# Patient Record
Sex: FEMALE | Race: BLACK OR AFRICAN AMERICAN | Employment: FULL TIME | URBAN - METROPOLITAN AREA
[De-identification: names, ages, dates, MRNs, and addresses within clinical notes are randomized per-mention and may not be internally consistent; named-entity substitution may affect disease eponyms.]

---

## 2022-02-19 ENCOUNTER — HOSPITAL ENCOUNTER (OUTPATIENT)
Age: 37
Setting detail: OBSERVATION
Discharge: HOME | End: 2022-02-20
Attending: OBSTETRICS & GYNECOLOGY | Admitting: OBSTETRICS & GYNECOLOGY
Payer: COMMERCIAL

## 2022-02-19 ENCOUNTER — APPOINTMENT (OUTPATIENT)
Dept: GENERAL RADIOLOGY | Age: 37
End: 2022-02-19
Payer: COMMERCIAL

## 2022-02-19 DIAGNOSIS — D50.0 BLOOD LOSS ANEMIA: Primary | ICD-10-CM

## 2022-02-19 PROBLEM — D64.9 ANEMIA: Status: ACTIVE | Noted: 2022-02-19

## 2022-02-19 LAB
A/G RATIO: 1.6 (ref 1.1–2.2)
ABO/RH: NORMAL
ALBUMIN SERPL-MCNC: 4.4 G/DL (ref 3.4–5)
ALP BLD-CCNC: 59 U/L (ref 40–129)
ALT SERPL-CCNC: <5 U/L (ref 10–40)
ANION GAP SERPL CALCULATED.3IONS-SCNC: 10 MMOL/L (ref 3–16)
ANTIBODY SCREEN: NORMAL
APTT: 28.3 SEC (ref 26.2–38.6)
AST SERPL-CCNC: 14 U/L (ref 15–37)
BACTERIA: ABNORMAL /HPF
BASOPHILS ABSOLUTE: 0.1 K/UL (ref 0–0.2)
BASOPHILS RELATIVE PERCENT: 1.1 %
BILIRUB SERPL-MCNC: <0.2 MG/DL (ref 0–1)
BILIRUBIN URINE: NEGATIVE
BLOOD, URINE: ABNORMAL
BUN BLDV-MCNC: 6 MG/DL (ref 7–20)
CALCIUM SERPL-MCNC: 9.2 MG/DL (ref 8.3–10.6)
CHLORIDE BLD-SCNC: 102 MMOL/L (ref 99–110)
CLARITY: CLEAR
CO2: 24 MMOL/L (ref 21–32)
COLOR: YELLOW
CREAT SERPL-MCNC: 0.6 MG/DL (ref 0.6–1.1)
EOSINOPHILS ABSOLUTE: 0.1 K/UL (ref 0–0.6)
EOSINOPHILS RELATIVE PERCENT: 1.5 %
EPITHELIAL CELLS, UA: 1 /HPF (ref 0–5)
GFR AFRICAN AMERICAN: >60
GFR NON-AFRICAN AMERICAN: >60
GLUCOSE BLD-MCNC: 116 MG/DL (ref 70–99)
GLUCOSE URINE: NEGATIVE MG/DL
HCG QUALITATIVE: NEGATIVE
HCT VFR BLD CALC: 22 % (ref 36–48)
HEMOGLOBIN: 6.8 G/DL (ref 12–16)
HYALINE CASTS: 0 /LPF (ref 0–8)
INR BLD: 0.98 (ref 0.88–1.12)
KETONES, URINE: NEGATIVE MG/DL
LEUKOCYTE ESTERASE, URINE: NEGATIVE
LYMPHOCYTES ABSOLUTE: 1.5 K/UL (ref 1–5.1)
LYMPHOCYTES RELATIVE PERCENT: 32.4 %
MCH RBC QN AUTO: 23.6 PG (ref 26–34)
MCHC RBC AUTO-ENTMCNC: 30.7 G/DL (ref 31–36)
MCV RBC AUTO: 76.9 FL (ref 80–100)
MICROSCOPIC EXAMINATION: YES
MONOCYTES ABSOLUTE: 0.3 K/UL (ref 0–1.3)
MONOCYTES RELATIVE PERCENT: 7.3 %
NEUTROPHILS ABSOLUTE: 2.7 K/UL (ref 1.7–7.7)
NEUTROPHILS RELATIVE PERCENT: 57.7 %
NITRITE, URINE: NEGATIVE
PDW BLD-RTO: 18.6 % (ref 12.4–15.4)
PH UA: 8.5 (ref 5–8)
PLATELET # BLD: 446 K/UL (ref 135–450)
PMV BLD AUTO: 7.5 FL (ref 5–10.5)
POTASSIUM REFLEX MAGNESIUM: 4.4 MMOL/L (ref 3.5–5.1)
PROTEIN UA: 30 MG/DL
PROTHROMBIN TIME: 11.1 SEC (ref 9.9–12.7)
RBC # BLD: 2.86 M/UL (ref 4–5.2)
RBC UA: 25 /HPF (ref 0–4)
SODIUM BLD-SCNC: 136 MMOL/L (ref 136–145)
SPECIFIC GRAVITY UA: 1.02 (ref 1–1.03)
TOTAL PROTEIN: 7.2 G/DL (ref 6.4–8.2)
TROPONIN: <0.01 NG/ML
URINE REFLEX TO CULTURE: ABNORMAL
URINE TYPE: ABNORMAL
UROBILINOGEN, URINE: 0.2 E.U./DL
WBC # BLD: 4.7 K/UL (ref 4–11)
WBC UA: 1 /HPF (ref 0–5)

## 2022-02-19 PROCEDURE — 84484 ASSAY OF TROPONIN QUANT: CPT

## 2022-02-19 PROCEDURE — 85730 THROMBOPLASTIN TIME PARTIAL: CPT

## 2022-02-19 PROCEDURE — 86850 RBC ANTIBODY SCREEN: CPT

## 2022-02-19 PROCEDURE — 71045 X-RAY EXAM CHEST 1 VIEW: CPT

## 2022-02-19 PROCEDURE — 85025 COMPLETE CBC W/AUTO DIFF WBC: CPT

## 2022-02-19 PROCEDURE — 84703 CHORIONIC GONADOTROPIN ASSAY: CPT

## 2022-02-19 PROCEDURE — 2580000003 HC RX 258: Performed by: OBSTETRICS & GYNECOLOGY

## 2022-02-19 PROCEDURE — 86923 COMPATIBILITY TEST ELECTRIC: CPT

## 2022-02-19 PROCEDURE — 85610 PROTHROMBIN TIME: CPT

## 2022-02-19 PROCEDURE — 80053 COMPREHEN METABOLIC PANEL: CPT

## 2022-02-19 PROCEDURE — 86900 BLOOD TYPING SEROLOGIC ABO: CPT

## 2022-02-19 PROCEDURE — 93005 ELECTROCARDIOGRAM TRACING: CPT | Performed by: EMERGENCY MEDICINE

## 2022-02-19 PROCEDURE — G0378 HOSPITAL OBSERVATION PER HR: HCPCS

## 2022-02-19 PROCEDURE — 81001 URINALYSIS AUTO W/SCOPE: CPT

## 2022-02-19 PROCEDURE — 99283 EMERGENCY DEPT VISIT LOW MDM: CPT

## 2022-02-19 PROCEDURE — P9016 RBC LEUKOCYTES REDUCED: HCPCS

## 2022-02-19 PROCEDURE — A4216 STERILE WATER/SALINE, 10 ML: HCPCS | Performed by: OBSTETRICS & GYNECOLOGY

## 2022-02-19 PROCEDURE — 86901 BLOOD TYPING SEROLOGIC RH(D): CPT

## 2022-02-19 PROCEDURE — 36415 COLL VENOUS BLD VENIPUNCTURE: CPT

## 2022-02-19 RX ORDER — SODIUM CHLORIDE 9 MG/ML
INJECTION, SOLUTION INTRAVENOUS CONTINUOUS
Status: DISCONTINUED | OUTPATIENT
Start: 2022-02-19 | End: 2022-02-20 | Stop reason: HOSPADM

## 2022-02-19 RX ORDER — SODIUM CHLORIDE 0.9 % (FLUSH) 0.9 %
5-40 SYRINGE (ML) INJECTION PRN
Status: DISCONTINUED | OUTPATIENT
Start: 2022-02-19 | End: 2022-02-20 | Stop reason: HOSPADM

## 2022-02-19 RX ORDER — SODIUM CHLORIDE 9 MG/ML
INJECTION, SOLUTION INTRAVENOUS PRN
Status: DISCONTINUED | OUTPATIENT
Start: 2022-02-19 | End: 2022-02-20 | Stop reason: HOSPADM

## 2022-02-19 RX ORDER — ACETAMINOPHEN 650 MG/1
650 SUPPOSITORY RECTAL EVERY 6 HOURS PRN
Status: DISCONTINUED | OUTPATIENT
Start: 2022-02-19 | End: 2022-02-20 | Stop reason: HOSPADM

## 2022-02-19 RX ORDER — ONDANSETRON 4 MG/1
4 TABLET, ORALLY DISINTEGRATING ORAL EVERY 8 HOURS PRN
Status: DISCONTINUED | OUTPATIENT
Start: 2022-02-19 | End: 2022-02-20 | Stop reason: HOSPADM

## 2022-02-19 RX ORDER — SODIUM CHLORIDE 9 MG/ML
25 INJECTION, SOLUTION INTRAVENOUS PRN
Status: DISCONTINUED | OUTPATIENT
Start: 2022-02-19 | End: 2022-02-20 | Stop reason: HOSPADM

## 2022-02-19 RX ORDER — ONDANSETRON 2 MG/ML
4 INJECTION INTRAMUSCULAR; INTRAVENOUS EVERY 6 HOURS PRN
Status: DISCONTINUED | OUTPATIENT
Start: 2022-02-19 | End: 2022-02-20 | Stop reason: HOSPADM

## 2022-02-19 RX ORDER — SODIUM CHLORIDE 0.9 % (FLUSH) 0.9 %
5-40 SYRINGE (ML) INJECTION EVERY 12 HOURS SCHEDULED
Status: DISCONTINUED | OUTPATIENT
Start: 2022-02-19 | End: 2022-02-20 | Stop reason: HOSPADM

## 2022-02-19 RX ORDER — ACETAMINOPHEN 325 MG/1
650 TABLET ORAL EVERY 6 HOURS PRN
Status: DISCONTINUED | OUTPATIENT
Start: 2022-02-19 | End: 2022-02-20 | Stop reason: HOSPADM

## 2022-02-19 RX ADMIN — SODIUM CHLORIDE: 9 INJECTION, SOLUTION INTRAVENOUS at 21:52

## 2022-02-19 RX ADMIN — Medication 10 ML: at 21:58

## 2022-02-19 ASSESSMENT — PAIN DESCRIPTION - ORIENTATION: ORIENTATION: RIGHT

## 2022-02-19 ASSESSMENT — PAIN DESCRIPTION - LOCATION: LOCATION: ARM

## 2022-02-19 ASSESSMENT — PAIN DESCRIPTION - PAIN TYPE: TYPE: ACUTE PAIN

## 2022-02-19 ASSESSMENT — PAIN DESCRIPTION - DESCRIPTORS: DESCRIPTORS: ACHING

## 2022-02-20 VITALS
RESPIRATION RATE: 18 BRPM | WEIGHT: 143.52 LBS | DIASTOLIC BLOOD PRESSURE: 70 MMHG | OXYGEN SATURATION: 100 % | TEMPERATURE: 97.9 F | HEIGHT: 63 IN | BODY MASS INDEX: 25.43 KG/M2 | SYSTOLIC BLOOD PRESSURE: 106 MMHG | HEART RATE: 82 BPM

## 2022-02-20 LAB
BLOOD BANK DISPENSE STATUS: NORMAL
BLOOD BANK PRODUCT CODE: NORMAL
BPU ID: NORMAL
DESCRIPTION BLOOD BANK: NORMAL
EKG ATRIAL RATE: 86 BPM
EKG DIAGNOSIS: NORMAL
EKG P AXIS: 57 DEGREES
EKG P-R INTERVAL: 140 MS
EKG Q-T INTERVAL: 368 MS
EKG QRS DURATION: 68 MS
EKG QTC CALCULATION (BAZETT): 440 MS
EKG R AXIS: 37 DEGREES
EKG T AXIS: -11 DEGREES
EKG VENTRICULAR RATE: 86 BPM
HCT VFR BLD CALC: 29 % (ref 36–48)
HEMOGLOBIN: 9.3 G/DL (ref 12–16)
IRON SATURATION: 14 % (ref 15–50)
IRON: 59 UG/DL (ref 37–145)
MCH RBC QN AUTO: 25.7 PG (ref 26–34)
MCHC RBC AUTO-ENTMCNC: 32 G/DL (ref 31–36)
MCV RBC AUTO: 80.2 FL (ref 80–100)
PDW BLD-RTO: 18.7 % (ref 12.4–15.4)
PLATELET # BLD: 391 K/UL (ref 135–450)
PMV BLD AUTO: 7.3 FL (ref 5–10.5)
RBC # BLD: 3.61 M/UL (ref 4–5.2)
TOTAL IRON BINDING CAPACITY: 431 UG/DL (ref 260–445)
WBC # BLD: 4.7 K/UL (ref 4–11)

## 2022-02-20 PROCEDURE — P9016 RBC LEUKOCYTES REDUCED: HCPCS

## 2022-02-20 PROCEDURE — 36415 COLL VENOUS BLD VENIPUNCTURE: CPT

## 2022-02-20 PROCEDURE — 93010 ELECTROCARDIOGRAM REPORT: CPT | Performed by: INTERNAL MEDICINE

## 2022-02-20 PROCEDURE — 2580000003 HC RX 258: Performed by: OBSTETRICS & GYNECOLOGY

## 2022-02-20 PROCEDURE — 83540 ASSAY OF IRON: CPT

## 2022-02-20 PROCEDURE — 85027 COMPLETE CBC AUTOMATED: CPT

## 2022-02-20 PROCEDURE — G0378 HOSPITAL OBSERVATION PER HR: HCPCS

## 2022-02-20 PROCEDURE — 94760 N-INVAS EAR/PLS OXIMETRY 1: CPT

## 2022-02-20 PROCEDURE — A4216 STERILE WATER/SALINE, 10 ML: HCPCS | Performed by: OBSTETRICS & GYNECOLOGY

## 2022-02-20 PROCEDURE — 83550 IRON BINDING TEST: CPT

## 2022-02-20 PROCEDURE — 36430 TRANSFUSION BLD/BLD COMPNT: CPT

## 2022-02-20 RX ORDER — FERROUS SULFATE 325(65) MG
325 TABLET ORAL 2 TIMES DAILY
Qty: 60 TABLET | Refills: 1 | Status: SHIPPED | OUTPATIENT
Start: 2022-02-20

## 2022-02-20 RX ORDER — IBUPROFEN 800 MG/1
800 TABLET ORAL EVERY 6 HOURS PRN
Qty: 50 TABLET | Refills: 3 | Status: SHIPPED | OUTPATIENT
Start: 2022-02-20

## 2022-02-20 RX ADMIN — Medication 10 ML: at 08:11

## 2022-02-20 ASSESSMENT — PAIN SCALES - GENERAL: PAINLEVEL_OUTOF10: 0

## 2022-02-20 NOTE — ED PROVIDER NOTES
[address] [facility]        Pt Name: Erich Lopez  MRN: [id number]  [place] 1985  Date of evaluation: 2/19/2022  Provider: Cong Laura PA-C  PCP: No primary care provider on file. Note Started: 8:20 PM EST       [place]. I have evaluated this patient. My supervising physician was available for consultation. I personally saw the patient and independently provided 32 minutes of non-concurrent critical care time out of the total critical care time provided. This excludes time spent doing separately billable procedures. This includes time at the bedside, data interpretation, medication management, obtaining critical history from collateral sources if the patient is unable to provide it directly, and physician consultation. Specifics of interventions taken and potentially life-threatening diagnostic considerations are listed above in the medical decision making. CHIEF COMPLAINT       Chief Complaint   Patient presents with    Fatigue     Patient states last week she has been feeling increased fatigue that began a week ago. Patient states she was previously admitted in december for same complaint and needed a blood transfusion, feels similiar today. Patient states she also is having shortness of breath. HISTORY OF PRESENT ILLNESS   (Location, Timing/Onset, Context/Setting, Quality, Duration, Modifying Factors, Severity, Associated Signs and Symptoms)  Note limiting factors. Chief Complaint: General fatigue    Erich Lopez is a 39 y.o. female who presents to the emergency department with a chief complaint of general fatigue, lightheadedness, shortness of breath. This been ongoing for the past 1 week. States she was having some chest pain earlier in the week but has not had any pain for multiple days. Having some occasional cramping in her abdomen but denies any pain at this time.   States that she was admitted to the hospital in December requiring blood transfusion related to heavy menses and uterine fibroids. Currently on iron supplements. Denies any other ongoing medical issues and not on any other medication. States that she began her menstrual cycle 6 days ago and was extremely heavy 2 days ago. Now she is just having spotting. Denies vomiting, hemoptysis, hematemesis, hematuria, black tarry stools or bloody stools or any bleeding anywhere else. Nursing Notes were all reviewed and agreed with or any disagreements were addressed in the HPI. REVIEW OF SYSTEMS    (2-9 systems for level 4, 10 or more for level 5)     Review of Systems    Positives and Pertinent negatives as per HPI. Except as noted above in the ROS, all other systems were reviewed and negative. PAST MEDICAL HISTORY   History reviewed. No pertinent past medical history. SURGICAL HISTORY   History reviewed. No pertinent surgical history. CURRENTMEDICATIONS       Previous Medications    No medications on file         ALLERGIES     Patient has no known allergies. FAMILYHISTORY     History reviewed. No pertinent family history. SOCIAL HISTORY       Social History     Tobacco Use    Smoking status: Never Smoker    Smokeless tobacco: Never Used   Vaping Use    Vaping Use: Never used   Substance Use Topics    Alcohol use: Never    Drug use: Yes     Types: Marijuana (Weed)       SCREENINGS    [place] Coma Scale  Eye Opening: Spontaneous  Best Verbal Response: Oriented  Best Motor Response: Obeys commands  [place] Coma Scale Score: 15        PHYSICAL EXAM    (up to 7 for level 4, 8 or more for level 5)     ED Triage Vitals [02/19/22 1959]   BP Temp Temp Source Pulse Resp SpO2 Height Weight   109/74 97.9 °F (36.6 °C) Oral 98 20 100 % 5' 3\" (1.6 m) 135 lb (61.2 kg)       Physical Exam  Vitals and nursing note reviewed. Constitutional:       Appearance: She is well-developed. She is not diaphoretic. HENT:      Head: Atraumatic. Nose: Nose normal.   Eyes:      General:         Right eye: No discharge. Left eye: No discharge. Conjunctiva/sclera: Conjunctivae normal.   Cardiovascular:      Rate and Rhythm: Normal rate and regular rhythm. Heart sounds: No murmur heard. No friction rub. No gallop. Pulmonary:      Effort: Pulmonary effort is normal. No respiratory distress. Breath sounds: No stridor. No wheezing, rhonchi or rales. Abdominal:      General: Bowel sounds are normal. There is no distension. Palpations: Abdomen is soft. There is no mass. Tenderness: There is no abdominal tenderness. There is no guarding or rebound. Hernia: No hernia is present. Musculoskeletal:         General: No swelling. Normal range of motion. Cervical back: Normal range of motion. Skin:     General: Skin is warm and dry. Findings: No erythema or rash. Neurological:      Mental Status: She is alert and oriented to person, place, and time. Cranial Nerves: No cranial nerve deficit.    Psychiatric:         Behavior: Behavior normal.         DIAGNOSTIC RESULTS   LABS:    Labs Reviewed   CBC WITH AUTO DIFFERENTIAL - Abnormal; Notable for the following components:       Result Value    RBC 2.86 (*)     Hemoglobin 6.8 (*)     Hematocrit 22.0 (*)     MCV 76.9 (*)     MCH 23.6 (*)     MCHC 30.7 (*)     RDW 18.6 (*)     All other components within normal limits    Narrative:     Mariluz Hammonds  [facility] tel. [phone],  Hematology results called to and read back by RONNY Davila, 02/19/2022  20:43, by BIENVENIDO DAVILA JR. [facility]  Performed at:  [facility] [address], [place], [address]   Phone [phone]   COMPREHENSIVE METABOLIC PANEL W/ REFLEX TO MG FOR LOW K - Abnormal; Notable for the following components:    Glucose 116 (*)     BUN 6 (*)     ALT <5 (*)     AST 14 (*)     All other components within normal limits    Narrative:     Performed at:  [facility]  [address] [facility]  [place] [facility] [employer]   Phone [phone]   URINALYSIS WITH REFLEX TO CULTURE - Abnormal; Notable for the following components:    Blood, Urine MODERATE (*)     pH, UA 8.5 (*)     Protein, UA 30 (*)     All other components within normal limits    Narrative:     Performed at:  [facility] [address] [facility] [employer]   Phone [phone]   MICROSCOPIC URINALYSIS - Abnormal; Notable for the following components:    Bacteria, UA RARE (*)     RBC, UA 25 (*)     All other components within normal limits    Narrative:     Performed at:  [facility] [address] [facility] [place]  [employer]   Phone [phone]   TROPONIN    Narrative:     Performed at:  [facility] [address],  [place], [place]   Phone [phone]   HCG, SERUM, QUALITATIVE    Narrative:     Performed at:  [facility] [address]  [place], [place]   Phone [phone]   APTT    Narrative:     Performed at:  [facility] [address] [place],  [place] [employer]   Phone [phone]   PROTIME-INR    Narrative:     Performed at:  [facility] [address] [place],  [employer]   Phone [phone]   TYPE AND SCREEN   PREPARE RBC (CROSSMATCH)       When ordered only abnormal lab results are displayed. All other labs were within normal range or not returned as of this dictation. EKG: When ordered, EKG's are interpreted by the Emergency Department Physician in the absence of a cardiologist.  Please see their note for interpretation of EKG.     RADIOLOGY:   Non-plain film images such as CT, Ultrasound and MRI are read by the radiologist. Plain radiographic images are visualized and preliminarily interpreted by the ED Provider with the below findings:        Interpretation per the Radiologist below, if available at the time of this note:    XR CHEST PORTABLE   Final Result   Hypoinflation with no acute pulmonary abnormality. No results found. PROCEDURES   Unless otherwise noted below, none     Procedures    CRITICAL CARE TIME       CONSULTS: Dr. Majo Chopra and DIFFERENTIAL DIAGNOSIS/MDM:   Vitals:    Vitals:    02/19/22 1959   BP: 109/74   Pulse: 98   Resp: 20   Temp: 97.9 °F (36.6 °C)   TempSrc: Oral   SpO2: 100%   Weight: 135 lb (61.2 kg)   Height: 5' 3\" (1.6 m)       Patient was given the following medications:  Medications   0.9 % sodium chloride infusion (has no administration in time range)           I have discussed with the patient the rationale for blood component transfusion; its benefits in treating or preventing fatigue, organ damage, or death; and its risk which includes mild transfusion reactions, rare risk of blood borne infection, or more serious but rare reactions. I have discussed the alternatives to transfusion, including the risk and consequences of not receiving transfusion. The patient had an opportunity to ask questions and had agreed to proceed with transfusion of blood components. Patient presented with symptomatic anemia. She has been having general fatigue, shortness of breath and near syncope-like symptoms just with mild ambulation. Has history of heavy menses and uterine fibroids and was admitted to the hospital in [place] in December. She is visiting here. States she is only used 1 pad today is only minimally spotting. Does not use any anticoagulants. Only history of anemia and on iron supplements. Her hemoglobin is 6.8. Remainder work-up is unremarkable. I did discuss this with gynecology and given how symptomatic she has they will admit her for observation and transfusion. Do not believe any further work-up or testing is warranted at this time. Patient was stable time of admission.     FINAL IMPRESSION      1. Blood loss anemia          DISPOSITION/PLAN   DISPOSITION Decision To Admit 02/19/2022 09:08:52 PM      PATIENT REFERRED TO:  No follow-up provider specified.     DISCHARGE MEDICATIONS:  New Prescriptions    No medications on file       DISCONTINUED MEDICATIONS:  Discontinued Medications    No medications on file              (Please note that portions of this note were completed with a voice recognition program.  Efforts were made to edit the dictations but occasionally words are mis-transcribed.)    Mindy Morgan PA-C (electronically signed)            Mindy Morgan PA-C  02/19/22 5939

## 2022-02-20 NOTE — ED PROVIDER NOTES
EKG is reviewed by myself. Dated today at 2011. Rate 86 sinus rhythm with some flat T waves.        Mac Mcclure MD  02/19/22 2013

## 2022-02-20 NOTE — ED NOTES
Per MD Cynthia Crabtree, to receive total of 2 units of PRBCs total.      Edgardo Silva RN  02/19/22 5625

## 2022-02-20 NOTE — H&P
Note    CHIEF COMPLAINT:   dizzyness after a heavy period    History obtained from patient    HISTORY OF PRESENT ILLNESS:     The patient is a 39 y.o. [de-identified] female with significant past medical history of menorrhagia and fibroid uterus who presents with lightheadness and weakness. She is visitng family from [place]. Admits to hospital admission and blood transfusion 2months ago when in [place] for anemia from menorrhagia. Was told her fibroids were about the size of a quarter. Reports her period has gotten heavier over the past several months. Was recommended either a myomectomy or hysterectomy in [place] but she has declined both options. She is not intersted in future childbearing. Past Medical History:    History reviewed. No pertinent past medical history. Past Surgical History:    History reviewed. No pertinent surgical history. Past Gynecological History:      meds:  Current Facility-Administered Medications:     0.9 % sodium chloride infusion, , IntraVENous, PRN, Charly Merrill PA-C    sodium chloride flush 0.9 % injection 5-40 mL, 5-40 mL, IntraVENous, 2 times per day, Danielito Cheney MD, 10 mL at 02/20/22 0811    sodium chloride flush 0.9 % injection 5-40 mL, 5-40 mL, IntraVENous, PRN, Danielito Cheney MD    0.9 % sodium chloride infusion, 25 mL, IntraVENous, PRN, Danielito Cheney MD    acetaminophen (TYLENOL) tablet 650 mg, 650 mg, Oral, Q6H PRN **OR** acetaminophen (TYLENOL) suppository 650 mg, 650 mg, Rectal, Q6H PRN, Danielito Cheney MD    ondansetron (ZOFRAN-ODT) disintegrating tablet 4 mg, 4 mg, Oral, Q8H PRN **OR** ondansetron (ZOFRAN) injection 4 mg, 4 mg, IntraVENous, Q6H PRN, Danielito Cheney MD    0.9 % sodium chloride infusion, , IntraVENous, Continuous, Danielito Cheney MD, Last Rate: 75 mL/hr at 02/19/22 2152, New Bag at 02/19/22 2152    0.9 % sodium chloride infusion, , IntraVENous, PRN, Danielito Cheney MD       Allergies:  Patient has no known allergies.      Social History:  TOBACCO: reports that she has never smoked. She has never used smokeless tobacco.    Family History:   History reviewed. No pertinent family history. PHYSICAL EXAM:    Vitals:  /70   Pulse 82   Temp 97.9 °F (36.6 °C) (Oral)   Resp 18   Ht 5' 3\" (1.6 m)   Wt 143 lb 8.3 oz (65.1 kg)   LMP 02/13/2022   SpO2 100%   BMI 25.42 kg/m²     CONSTITUTIONAL:  awake, alert, cooperative, no apparent distress, and appears stated age  Chest : CTA, B  CV: RRR  Abd: soft nontender, no masses palpable. External Genitalia: General appearance; normal, Hair distribution; normal, Lesions absent  Pelvic exam: deferred. No active bleeding. DATA:  Recent Labs     02/19/22 2026 02/20/22  0608   WBC 4.7 4.7   HGB 6.8* 9.3*   HCT 22.0* 29.0*    391     Recent Labs     02/19/22 2026      K 4.4      CO2 24   BUN 6*   CREATININE 0.6   CALCIUM 9.2   AST 14*   ALT <5*       Labs:    CBC:   Lab Results   Component Value Date    WBC 4.7 02/20/2022    RBC 3.61 02/20/2022    HGB 9.3 02/20/2022    HCT 29.0 02/20/2022    MCV 80.2 02/20/2022    RDW 18.7 02/20/2022     02/20/2022       IMPRESSION/RECOMMENDATIONS:      Principal Problem:   symptomatic Anemia, h/o menorrhagia from fibroid uterus. S/p 2U pRBC transfusion and feeling much better. Discharge home today on iron and motrin. Fu prn.     Plan: discharge

## 2022-02-20 NOTE — PROGRESS NOTES
I have discussed with the patient the rationale for blood component transfusion; its benefits in treating or preventing fatigue, organ damage, or death; and its risk which includes mild transfusion reactions, rare risk of blood borne infection, or more serious but rare reactions. I have discussed the alternatives to transfusion, including the risk and consequences of not receiving transfusion. The patient had an opportunity to ask questions and had agreed to proceed with transfusion of blood components. Will type and cross for 2 units of pRBC's and transfuse for symptomatic anemia.

## 2022-02-20 NOTE — PROGRESS NOTES
4 Eyes Skin Assessment     NAME:  Yfn Fonseca  YOB: 1985  MEDICAL RECORD NUMBER:  [id number]    The patient is being assess for  Admission    I agree that 2 RN's have performed a thorough Head to Toe Skin Assessment on the patient. ALL assessment sites listed below have been assessed. Areas assessed by both nurses:    Head, Face, Ears, Shoulders, Back, Chest, Arms, Elbows, Hands, Sacrum. Buttock, Coccyx, Ischium and Legs. Feet and Heels        Does the Patient have a Wound?  No noted wound(s)       Mahendra Prevention initiated:  No   Wound Care Orders initiated:  No    Pressure Injury (Stage 3,4, Unstageable, DTI, NWPT, and Complex wounds) if present place consult order under [de-identified] No    New and Established Ostomies if present place consult order under : No      Nurse 1 eSignature: Electronically signed by Amber Baker RN on 2/20/22 at 12:21 AM EST    **SHARE this note so that the co-signing nurse is able to place an eSignature**    Nurse 2 eSignature: Electronically signed by Bill Devine RN on 2/20/22 at 3:27 AM EST

## 2022-02-23 LAB
BLOOD BANK DISPENSE STATUS: NORMAL
BLOOD BANK DISPENSE STATUS: NORMAL
BLOOD BANK PRODUCT CODE: NORMAL
BLOOD BANK PRODUCT CODE: NORMAL
BPU ID: NORMAL
BPU ID: NORMAL
DESCRIPTION BLOOD BANK: NORMAL
DESCRIPTION BLOOD BANK: NORMAL

## 2022-09-06 ENCOUNTER — HOSPITAL ENCOUNTER (OUTPATIENT)
Dept: MRI IMAGING | Age: 37
Discharge: HOME | End: 2022-09-06
Payer: COMMERCIAL

## 2022-09-06 DIAGNOSIS — N94.6 MENORRHALGIA: ICD-10-CM

## 2022-09-06 DIAGNOSIS — D25.9 UTERINE LEIOMYOMA, UNSPECIFIED LOCATION: ICD-10-CM

## 2022-09-06 DIAGNOSIS — N93.9 UTERINE BLEEDING: ICD-10-CM

## 2022-09-06 PROCEDURE — 72197 MRI PELVIS W/O & W/DYE: CPT

## 2022-09-06 PROCEDURE — 6360000004 HC RX CONTRAST MEDICATION: Performed by: OBSTETRICS & GYNECOLOGY

## 2022-09-06 PROCEDURE — A9577 INJ MULTIHANCE: HCPCS | Performed by: OBSTETRICS & GYNECOLOGY

## 2022-09-06 PROCEDURE — 2580000003 HC RX 258: Performed by: OBSTETRICS & GYNECOLOGY

## 2022-09-06 RX ORDER — SODIUM CHLORIDE 9 MG/ML
INJECTION, SOLUTION INTRAVENOUS CONTINUOUS
Status: DISCONTINUED | OUTPATIENT
Start: 2022-09-06 | End: 2022-09-07 | Stop reason: HOSPADM

## 2022-09-06 RX ADMIN — GADOBENATE DIMEGLUMINE 12 ML: 529 INJECTION, SOLUTION INTRAVENOUS at 10:39

## 2022-09-06 RX ADMIN — SODIUM CHLORIDE: 9 INJECTION, SOLUTION INTRAVENOUS at 10:45

## 2022-09-13 PROBLEM — D64.89 OTHER SPECIFIED ANEMIAS: Status: ACTIVE | Noted: 2022-09-13

## 2022-09-13 RX ORDER — SODIUM CHLORIDE 9 MG/ML
INJECTION, SOLUTION INTRAVENOUS CONTINUOUS
Status: CANCELLED | OUTPATIENT
Start: 2022-09-13

## 2022-09-13 RX ORDER — SODIUM CHLORIDE 0.9 % (FLUSH) 0.9 %
5-40 SYRINGE (ML) INJECTION PRN
Status: CANCELLED | OUTPATIENT
Start: 2022-09-13

## 2022-09-14 ENCOUNTER — HOSPITAL ENCOUNTER (OUTPATIENT)
Dept: ONCOLOGY | Age: 37
Setting detail: INFUSION SERIES
Discharge: HOME | End: 2022-09-14
Payer: COMMERCIAL

## 2022-09-14 VITALS
TEMPERATURE: 98.6 F | SYSTOLIC BLOOD PRESSURE: 104 MMHG | HEART RATE: 112 BPM | DIASTOLIC BLOOD PRESSURE: 65 MMHG | RESPIRATION RATE: 16 BRPM

## 2022-09-14 DIAGNOSIS — D64.9 ANEMIA, UNSPECIFIED TYPE: Primary | ICD-10-CM

## 2022-09-14 PROCEDURE — 96365 THER/PROPH/DIAG IV INF INIT: CPT

## 2022-09-14 PROCEDURE — 99211 OFF/OP EST MAY X REQ PHY/QHP: CPT

## 2022-09-14 PROCEDURE — 96366 THER/PROPH/DIAG IV INF ADDON: CPT

## 2022-09-14 PROCEDURE — 6360000002 HC RX W HCPCS: Performed by: OBSTETRICS & GYNECOLOGY

## 2022-09-14 PROCEDURE — 2580000003 HC RX 258: Performed by: OBSTETRICS & GYNECOLOGY

## 2022-09-14 RX ORDER — SODIUM CHLORIDE 9 MG/ML
INJECTION, SOLUTION INTRAVENOUS CONTINUOUS
Status: ACTIVE | OUTPATIENT
Start: 2022-09-14 | End: 2022-09-14

## 2022-09-14 RX ORDER — SODIUM CHLORIDE 9 MG/ML
INJECTION, SOLUTION INTRAVENOUS CONTINUOUS
Status: CANCELLED | OUTPATIENT
Start: 2022-09-21

## 2022-09-14 RX ORDER — SODIUM CHLORIDE 0.9 % (FLUSH) 0.9 %
5-40 SYRINGE (ML) INJECTION PRN
Status: DISCONTINUED | OUTPATIENT
Start: 2022-09-14 | End: 2022-09-15 | Stop reason: HOSPADM

## 2022-09-14 RX ORDER — SODIUM CHLORIDE 0.9 % (FLUSH) 0.9 %
5-40 SYRINGE (ML) INJECTION PRN
Status: CANCELLED | OUTPATIENT
Start: 2022-09-21

## 2022-09-14 RX ADMIN — SODIUM CHLORIDE: 9 INJECTION, SOLUTION INTRAVENOUS at 12:56

## 2022-09-14 RX ADMIN — Medication 10 ML: at 12:55

## 2022-09-14 RX ADMIN — IRON SUCROSE 300 MG: 20 INJECTION, SOLUTION INTRAVENOUS at 12:56

## 2022-09-14 NOTE — PROGRESS NOTES
Patient ambulatory to department for first of three doses of venofer. Tolerated venofer infusion well with no adverse rx noted. Given avs with information about venofer. Verbally told about most common possible side effects. To return Friday for next infusion.

## 2022-09-16 ENCOUNTER — HOSPITAL ENCOUNTER (OUTPATIENT)
Dept: ONCOLOGY | Age: 37
Setting detail: INFUSION SERIES
Discharge: HOME | End: 2022-09-16
Payer: COMMERCIAL

## 2022-09-16 VITALS
SYSTOLIC BLOOD PRESSURE: 102 MMHG | TEMPERATURE: 97.8 F | RESPIRATION RATE: 16 BRPM | HEART RATE: 87 BPM | DIASTOLIC BLOOD PRESSURE: 65 MMHG

## 2022-09-16 DIAGNOSIS — D64.9 ANEMIA, UNSPECIFIED TYPE: Primary | ICD-10-CM

## 2022-09-16 PROCEDURE — 99211 OFF/OP EST MAY X REQ PHY/QHP: CPT

## 2022-09-16 PROCEDURE — 2580000003 HC RX 258: Performed by: OBSTETRICS & GYNECOLOGY

## 2022-09-16 PROCEDURE — 6360000002 HC RX W HCPCS: Performed by: OBSTETRICS & GYNECOLOGY

## 2022-09-16 PROCEDURE — 96365 THER/PROPH/DIAG IV INF INIT: CPT

## 2022-09-16 PROCEDURE — 96366 THER/PROPH/DIAG IV INF ADDON: CPT

## 2022-09-16 RX ORDER — SODIUM CHLORIDE 0.9 % (FLUSH) 0.9 %
5-40 SYRINGE (ML) INJECTION PRN
Status: CANCELLED | OUTPATIENT
Start: 2022-09-21

## 2022-09-16 RX ORDER — SODIUM CHLORIDE 9 MG/ML
INJECTION, SOLUTION INTRAVENOUS CONTINUOUS
Status: CANCELLED | OUTPATIENT
Start: 2022-09-21

## 2022-09-16 RX ORDER — SODIUM CHLORIDE 0.9 % (FLUSH) 0.9 %
5-40 SYRINGE (ML) INJECTION PRN
Status: DISCONTINUED | OUTPATIENT
Start: 2022-09-16 | End: 2022-09-17 | Stop reason: HOSPADM

## 2022-09-16 RX ORDER — SODIUM CHLORIDE 9 MG/ML
INJECTION, SOLUTION INTRAVENOUS CONTINUOUS
Status: ACTIVE | OUTPATIENT
Start: 2022-09-16 | End: 2022-09-16

## 2022-09-16 RX ADMIN — IRON SUCROSE 300 MG: 20 INJECTION, SOLUTION INTRAVENOUS at 13:03

## 2022-09-16 RX ADMIN — SODIUM CHLORIDE: 9 INJECTION, SOLUTION INTRAVENOUS at 13:01

## 2022-09-16 RX ADMIN — SODIUM CHLORIDE, PRESERVATIVE FREE 10 ML: 5 INJECTION INTRAVENOUS at 13:01

## 2022-09-16 NOTE — PROGRESS NOTES
Patient ambulatory to department for second of three doses of venofer. VSS. Pt denies comp;laints. No adverse reaction from previous infusion. IV access obtained. Venofer administered. Pt tolerated venofer infusion well with no adverse rx noted. VSS. Side effects reviewed. Pt v/u. Declined AVS. IV removed. Pt discharged home. To return Tuesday for next infusion.

## 2022-09-20 ENCOUNTER — HOSPITAL ENCOUNTER (OUTPATIENT)
Dept: ONCOLOGY | Age: 37
Setting detail: INFUSION SERIES
Discharge: HOME | End: 2022-09-20
Payer: COMMERCIAL

## 2022-09-20 VITALS
RESPIRATION RATE: 16 BRPM | DIASTOLIC BLOOD PRESSURE: 66 MMHG | SYSTOLIC BLOOD PRESSURE: 103 MMHG | HEART RATE: 86 BPM | TEMPERATURE: 97.7 F | OXYGEN SATURATION: 100 %

## 2022-09-20 DIAGNOSIS — D64.9 ANEMIA, UNSPECIFIED TYPE: Primary | ICD-10-CM

## 2022-09-20 PROCEDURE — 2580000003 HC RX 258: Performed by: OBSTETRICS & GYNECOLOGY

## 2022-09-20 PROCEDURE — 6360000002 HC RX W HCPCS: Performed by: OBSTETRICS & GYNECOLOGY

## 2022-09-20 PROCEDURE — 96365 THER/PROPH/DIAG IV INF INIT: CPT

## 2022-09-20 PROCEDURE — 96366 THER/PROPH/DIAG IV INF ADDON: CPT

## 2022-09-20 PROCEDURE — 99211 OFF/OP EST MAY X REQ PHY/QHP: CPT

## 2022-09-20 RX ORDER — SODIUM CHLORIDE 0.9 % (FLUSH) 0.9 %
5-40 SYRINGE (ML) INJECTION PRN
Status: CANCELLED | OUTPATIENT
Start: 2022-09-20

## 2022-09-20 RX ORDER — SODIUM CHLORIDE 9 MG/ML
INJECTION, SOLUTION INTRAVENOUS CONTINUOUS
Status: ACTIVE | OUTPATIENT
Start: 2022-09-20 | End: 2022-09-20

## 2022-09-20 RX ORDER — SODIUM CHLORIDE 0.9 % (FLUSH) 0.9 %
5-40 SYRINGE (ML) INJECTION PRN
Status: DISCONTINUED | OUTPATIENT
Start: 2022-09-20 | End: 2022-09-21 | Stop reason: HOSPADM

## 2022-09-20 RX ORDER — SODIUM CHLORIDE 9 MG/ML
INJECTION, SOLUTION INTRAVENOUS CONTINUOUS
Status: CANCELLED | OUTPATIENT
Start: 2022-09-20

## 2022-09-20 RX ADMIN — SODIUM CHLORIDE, PRESERVATIVE FREE 10 ML: 5 INJECTION INTRAVENOUS at 13:10

## 2022-09-20 RX ADMIN — IRON SUCROSE 300 MG: 20 INJECTION, SOLUTION INTRAVENOUS at 13:12

## 2022-09-20 RX ADMIN — SODIUM CHLORIDE: 9 INJECTION, SOLUTION INTRAVENOUS at 13:11

## 2022-09-20 NOTE — DISCHARGE INSTRUCTIONS
iron sucrose (injection)  Pronunciation:  EYE urn CAMILLA ovalles  Brand:  Venofer  What is the most important information I should know about iron sucrose? Follow all directions on your medicine label and package. Tell each of your healthcare providers about all your medical conditions, allergies, and all medicines you use. What is iron sucrose? Iron sucrose is used to treat iron deficiency anemia in people with kidney disease. Iron sucrose is for use in adults and children at least 3years old. Iron sucrose is not for treating other forms of anemia not caused by iron deficiency. Iron sucrose may also be used for purposes not listed in this medication guide. What should I discuss with my healthcare provider before I receive iron sucrose? You should not be treated with this medicine if you have ever had an allergic reaction to an iron injection. Tell your doctor if you have ever had:  hemochromatosis or iron overload (the buildup of excess iron). Tell your doctor if you are pregnant or plan to become pregnant. Iron sucrose can harm an unborn baby if you have a severe reaction to this medicine during your second or third trimester. However, not treating iron deficiency anemia during pregnancy may cause complications such as premature birth or low birth weight. The benefit of treating your condition during pregnancy may outweigh any risks. If you are breastfeeding, tell your doctor if you notice diarrhea or constipation in the nursing baby. How is iron sucrose given? Iron sucrose is given as an infusion into a vein. A healthcare provider will give you this injection. This medicine is sometimes given slowly, and the infusion can take up to 2.5 hours to complete. Tell your caregivers if you feel any burning, pain, or swelling around the IV needle when iron sucrose is injected. You will be watched closely for at least 30 minutes to make sure you do not have an allergic reaction.   You will need frequent medical tests to help your doctor determine how long to treat you with iron sucrose. What happens if I miss a dose? Call your doctor for instructions if you miss an appointment for your iron sucrose injection. What happens if I overdose? Seek emergency medical attention or call the Poison Help line at [phone]. What should I avoid while using iron sucrose? Avoid getting up too fast from a sitting or lying position, or you may feel dizzy. What are the possible side effects of iron sucrose? Get emergency medical help if you have signs of an allergic reaction:  hives, rash, itching; feeling light-headed; difficulty breathing; swelling of your face, lips, tongue, or throat. Tell your caregivers right away if you have:  problems with your dialysis vein access point;  chest pain;  high blood pressure --severe headache, blurred vision, pounding in your neck or ears;  low blood pressure --a light-headed feeling, like you might pass out; or  signs of inflammation in the lining of your stomach --pain or swelling, bloating, nausea, vomiting, loss of appetite, diarrhea, fever. Common side effects may include:  fever, cold or flu symptoms (sore throat, cough, stuffy nose, sneezing);  high or low blood pressure;  headache, dizziness;  nausea, vomiting, diarrhea;  muscle or joint pain, back pain;  pain or swelling in an arm or leg;  itching; or  bruising or irritation where the medicine was injected. This is not a complete list of side effects and others may occur. Call your doctor for medical advice about side effects. You may report side effects to FDA at 1-040-VOD-8787. What other drugs will affect iron sucrose? Treatment with iron sucrose injections can make it harder for your body to absorb iron medications you take by mouth. Tell your doctor if you are taking iron supplements or other iron-based oral medications, such as:  ferrous fumarate;  ferrous gluconate; or  ferrous sulfate, and others.   This list is not complete. Other drugs may affect iron sucrose, including prescription and over-the-counter medicines, vitamins, and herbal products. Not all possible drug interactions are listed here. Where can I get more information? Your doctor or pharmacist can provide more information about iron sucrose injection. Remember, keep this and all other medicines out of the reach of children, never share your medicines with others, and use this medication only for the indication prescribed. Every effort has been made to ensure that the information provided by [address] is accurate, up-to-date, and complete, but no guarantee is made to that effect. Drug information contained herein may be time sensitive. [facility] information has been compiled for use by healthcare practitioners and consumers in the [place] and therefore [facility] does not warrant that uses outside of the [place] are appropriate, unless specifically indicated otherwise. [facility]'s drug information does not endorse drugs, diagnose patients or recommend therapy. [facility] drug information is an informational resource designed to assist licensed healthcare practitioners in caring for their patients and/or to serve consumers viewing this service as a supplement to, and not a substitute for, the expertise, skill, knowledge and judgment of healthcare practitioners. The absence of a warning for a given drug or drug combination in no way should be construed to indicate that the drug or drug combination is safe, effective or appropriate for any given patient. [facility] does not assume any responsibility for any aspect of healthcare administered with the aid of information [facility] provides. The information contained herein is not intended to cover all possible uses, directions, precautions, warnings, drug interactions, allergic reactions, or adverse effects.  If you have questions about the drugs you are taking, check with your doctor, nurse or pharmacist.  Copyright 6514-6297 [employer]. Version: 7.01. Revision date: 1/27/2021. Care instructions adapted under license by [facility] ([facility]). If you have questions about a medical condition or this instruction, always ask your healthcare professional. [address] any warranty or liability for your use of this information. Anemia: Care Instructions  Your Care Instructions     Anemia is a low level of red blood cells, which carry oxygen throughout your body. Many things can cause anemia. Lack of iron is one of the most common causes. Your body needs iron to make hemoglobin, a substance in red blood cells that carries oxygen from the lungs to your body's cells. Without enough iron, the body produces fewer and smaller red blood cells. As a result, your body's cells do not get enough oxygen, and you feel tired and weak. And you may have trouble concentrating. Bleeding is the most common cause of a lack of iron. You may have heavy menstrual bleeding or bleeding caused by conditions such as ulcers, hemorrhoids, or cancer. Regular use of aspirin or other anti-inflammatory medicines (such as ibuprofen) also can cause bleeding in some people. A lack of iron in your diet also can cause anemia, especially at times when the body needs more iron, such as during pregnancy, infancy, and the teen years. Your doctor may have prescribed iron pills. It may take several months of treatment for your iron levels to return to normal. Your doctor also may suggest that you eat foods that are rich in iron, such as meat and beans. There are many other causes of anemia. It is not always due to a lack of iron. Finding the specific cause of your anemia will help your doctor find the right treatment for you. Follow-up care is a key part of your treatment and safety. Be sure to make and go to all appointments, and call your doctor if you are having problems.  It's also a good idea to know your test results and keep a list of the medicines you take. How can you care for yourself at home? Take your medicines exactly as prescribed. Call your doctor if you think you are having a problem with your medicine. If your doctor recommends iron pills, take them as directed:  Try to take the pills on an empty stomach about 1 hour before or 2 hours after meals. But you may need to take iron with food to avoid an upset stomach. Do not take antacids or drink milk or caffeine drinks (such as coffee, tea, or cola) at the same time or within 2 hours of the time that you take your iron. They can make it hard for your body to absorb the iron. Vitamin C (from food or supplements) helps your body absorb iron. Try taking iron pills with a glass of orange juice or some other food that is high in vitamin C, such as citrus fruits. Iron pills may cause stomach problems, such as heartburn, nausea, diarrhea, constipation, and cramps. Be sure to drink plenty of fluids, and include fruits, vegetables, and fiber in your diet each day. Iron pills often make your bowel movements dark or green. If you forget to take an iron pill, do not take a double dose of iron the next time you take a pill. Keep iron pills out of the reach of small children. An overdose of iron can be very dangerous. Follow your doctor's advice about eating iron-rich foods. These include red meat, shellfish, poultry, eggs, beans, raisins, whole-grain bread, and leafy green vegetables. Steam vegetables to help them keep their iron content. When should you call for help? Call 911 anytime you think you may need emergency care. For example, call if:    You have symptoms of a heart attack. These may include:  Chest pain or pressure, or a strange feeling in the chest.  Sweating. Shortness of breath. Nausea or vomiting. Pain, pressure, or a strange feeling in the back, neck, jaw, or upper belly or in one or both shoulders or arms. Lightheadedness or sudden weakness.   A fast or irregular heartbeat. After you call 911, the  may tell you to chew 1 adult-strength or 2 to 4 low-dose aspirin. Wait for an ambulance. Do not try to drive yourself. You passed out (lost consciousness). Call your doctor now or seek immediate medical care if:    You have new or increased shortness of breath. You are dizzy or lightheaded, or you feel like you may faint. Your fatigue and weakness continue or get worse. You have any abnormal bleeding, such as:  Nosebleeds. Vaginal bleeding that is different (heavier, more frequent, at a different time of the month) than what you are used to. Bloody or black stools, or rectal bleeding. Bloody or pink urine. Watch closely for changes in your health, and be sure to contact your doctor if:    You do not get better as expected. Where can you learn more? Go to [email or web address]://Three Screen [email or web address].sg. org and sign in to your [employer] account. Enter R301 in the SinDelantal box to learn more about \"Anemia: Care Instructions. \"     If you do not have an account, please click on the \"Sign Up Now\" link. Current as of: November 29, 2021               Content Version: 13.4  © 2006-2022 Healthwise, Incorporated. Care instructions adapted under license by [facility] [employer] [facility] ([facility]). If you have questions about a medical condition or this instruction, always ask your healthcare professional. [place] [address] any warranty or liability for your use of this information.

## 2022-09-20 NOTE — PROGRESS NOTES
Patient ambulatory to department for third of three doses of venofer. VSS. Pt reports headache after previous infusion. Encouraged increased fluid intake today. Pt v/u. Leyda Cadet No adverse reaction from previous infusion. IV access obtained. Venofer administered. Pt tolerated venofer infusion well with no adverse rx noted. VSS. Side effects reviewed. Pt v/u. Declined AVS. IV removed. Pt discharged home. To follow up with prescribing physician.

## 2022-10-31 DIAGNOSIS — N92.0 EXCESSIVE OR FREQUENT MENSTRUATION: ICD-10-CM

## 2022-10-31 DIAGNOSIS — D50.0 IRON DEFICIENCY ANEMIA SECONDARY TO BLOOD LOSS (CHRONIC): ICD-10-CM

## 2022-10-31 RX ORDER — SODIUM CHLORIDE 9 MG/ML
INJECTION, SOLUTION INTRAVENOUS CONTINUOUS
Status: CANCELLED | OUTPATIENT
Start: 2022-10-31

## 2022-10-31 RX ORDER — SODIUM CHLORIDE 0.9 % (FLUSH) 0.9 %
5-40 SYRINGE (ML) INJECTION PRN
Status: CANCELLED | OUTPATIENT
Start: 2022-10-31

## 2022-11-03 ENCOUNTER — HOSPITAL ENCOUNTER (OUTPATIENT)
Dept: ONCOLOGY | Age: 37
Setting detail: INFUSION SERIES
Discharge: HOME | End: 2022-11-03
Payer: COMMERCIAL

## 2022-11-03 VITALS
RESPIRATION RATE: 16 BRPM | TEMPERATURE: 98 F | HEART RATE: 73 BPM | SYSTOLIC BLOOD PRESSURE: 107 MMHG | DIASTOLIC BLOOD PRESSURE: 76 MMHG

## 2022-11-03 DIAGNOSIS — D50.0 IRON DEFICIENCY ANEMIA SECONDARY TO BLOOD LOSS (CHRONIC): Primary | ICD-10-CM

## 2022-11-03 DIAGNOSIS — N92.0 EXCESSIVE OR FREQUENT MENSTRUATION: ICD-10-CM

## 2022-11-03 DIAGNOSIS — D64.9 ANEMIA, UNSPECIFIED TYPE: ICD-10-CM

## 2022-11-03 PROCEDURE — 96366 THER/PROPH/DIAG IV INF ADDON: CPT

## 2022-11-03 PROCEDURE — 96365 THER/PROPH/DIAG IV INF INIT: CPT

## 2022-11-03 PROCEDURE — 2580000003 HC RX 258: Performed by: OBSTETRICS & GYNECOLOGY

## 2022-11-03 PROCEDURE — 99211 OFF/OP EST MAY X REQ PHY/QHP: CPT

## 2022-11-03 PROCEDURE — 6360000002 HC RX W HCPCS: Performed by: OBSTETRICS & GYNECOLOGY

## 2022-11-03 RX ORDER — SODIUM CHLORIDE 0.9 % (FLUSH) 0.9 %
5-40 SYRINGE (ML) INJECTION PRN
Status: DISCONTINUED | OUTPATIENT
Start: 2022-11-03 | End: 2022-11-04 | Stop reason: HOSPADM

## 2022-11-03 RX ORDER — SODIUM CHLORIDE 0.9 % (FLUSH) 0.9 %
5-40 SYRINGE (ML) INJECTION PRN
Status: CANCELLED | OUTPATIENT
Start: 2022-11-03

## 2022-11-03 RX ORDER — SODIUM CHLORIDE 9 MG/ML
INJECTION, SOLUTION INTRAVENOUS CONTINUOUS
Status: ACTIVE | OUTPATIENT
Start: 2022-11-03 | End: 2022-11-03

## 2022-11-03 RX ORDER — NAPROXEN 500 MG/1
500 TABLET ORAL 2 TIMES DAILY WITH MEALS
Status: ON HOLD | COMMUNITY
End: 2022-11-17 | Stop reason: HOSPADM

## 2022-11-03 RX ORDER — SODIUM CHLORIDE 9 MG/ML
INJECTION, SOLUTION INTRAVENOUS CONTINUOUS
Status: CANCELLED | OUTPATIENT
Start: 2022-11-03

## 2022-11-03 RX ORDER — MEDROXYPROGESTERONE ACETATE 10 MG/1
30 TABLET ORAL 2 TIMES DAILY
Status: ON HOLD | COMMUNITY
End: 2022-11-17 | Stop reason: HOSPADM

## 2022-11-03 RX ADMIN — SODIUM CHLORIDE: 9 INJECTION, SOLUTION INTRAVENOUS at 09:17

## 2022-11-03 RX ADMIN — SODIUM CHLORIDE, PRESERVATIVE FREE 10 ML: 5 INJECTION INTRAVENOUS at 09:16

## 2022-11-03 RX ADMIN — IRON SUCROSE 300 MG: 20 INJECTION, SOLUTION INTRAVENOUS at 09:19

## 2022-11-03 NOTE — DISCHARGE INSTRUCTIONS
iron sucrose (injection)  Pronunciation:  EYE urn CAMILLA ovalles  Brand:  Venofer  What is the most important information I should know about iron sucrose? Follow all directions on your medicine label and package. Tell each of your healthcare providers about all your medical conditions, allergies, and all medicines you use. What is iron sucrose? Iron sucrose is used to treat iron deficiency anemia in people with kidney disease. Iron sucrose is for use in adults and children at least 3years old. Iron sucrose is not for treating other forms of anemia not caused by iron deficiency. Iron sucrose may also be used for purposes not listed in this medication guide. What should I discuss with my healthcare provider before I receive iron sucrose? You should not be treated with this medicine if you have ever had an allergic reaction to an iron injection. Tell your doctor if you have ever had:  hemochromatosis or iron overload (the buildup of excess iron). Tell your doctor if you are pregnant or plan to become pregnant. Iron sucrose can harm an unborn baby if you have a severe reaction to this medicine during your second or third trimester. However, not treating iron deficiency anemia during pregnancy may cause complications such as premature birth or low birth weight. The benefit of treating your condition during pregnancy may outweigh any risks. If you are breastfeeding, tell your doctor if you notice diarrhea or constipation in the nursing baby. How is iron sucrose given? Iron sucrose is given as an infusion into a vein. A healthcare provider will give you this injection. This medicine is sometimes given slowly, and the infusion can take up to 2.5 hours to complete. Tell your caregivers if you feel any burning, pain, or swelling around the IV needle when iron sucrose is injected. You will be watched closely for at least 30 minutes to make sure you do not have an allergic reaction.   You will need frequent medical tests to help your doctor determine how long to treat you with iron sucrose. What happens if I miss a dose? Call your doctor for instructions if you miss an appointment for your iron sucrose injection. What happens if I overdose? Seek emergency medical attention or call the Poison Help line at [phone]. What should I avoid while using iron sucrose? Avoid getting up too fast from a sitting or lying position, or you may feel dizzy. What are the possible side effects of iron sucrose? Get emergency medical help if you have signs of an allergic reaction:  hives, rash, itching; feeling light-headed; difficulty breathing; swelling of your face, lips, tongue, or throat. Tell your caregivers right away if you have:  problems with your dialysis vein access point;  chest pain;  high blood pressure --severe headache, blurred vision, pounding in your neck or ears;  low blood pressure --a light-headed feeling, like you might pass out; or  signs of inflammation in the lining of your stomach --pain or swelling, bloating, nausea, vomiting, loss of appetite, diarrhea, fever. Common side effects may include:  fever, cold or flu symptoms (sore throat, cough, stuffy nose, sneezing);  high or low blood pressure;  headache, dizziness;  nausea, vomiting, diarrhea;  muscle or joint pain, back pain;  pain or swelling in an arm or leg;  itching; or  bruising or irritation where the medicine was injected. This is not a complete list of side effects and others may occur. Call your doctor for medical advice about side effects. You may report side effects to FDA at 3-066-VWR-0469. What other drugs will affect iron sucrose? Treatment with iron sucrose injections can make it harder for your body to absorb iron medications you take by mouth. Tell your doctor if you are taking iron supplements or other iron-based oral medications, such as:  ferrous fumarate;  ferrous gluconate; or  ferrous sulfate, and others.   This list is not complete. Other drugs may affect iron sucrose, including prescription and over-the-counter medicines, vitamins, and herbal products. Not all possible drug interactions are listed here. Where can I get more information? Your doctor or pharmacist can provide more information about iron sucrose injection. Remember, keep this and all other medicines out of the reach of children, never share your medicines with others, and use this medication only for the indication prescribed. Every effort has been made to ensure that the information provided by [address] is accurate, up-to-date, and complete, but no guarantee is made to that effect. Drug information contained herein may be time sensitive. [facility] information has been compiled for use by healthcare practitioners and consumers in the [place] and therefore [facility] does not warrant that uses outside of the [place] are appropriate, unless specifically indicated otherwise. [facility]'s drug information does not endorse drugs, diagnose patients or recommend therapy. [facility] drug information is an informational resource designed to assist licensed healthcare practitioners in caring for their patients and/or to serve consumers viewing this service as a supplement to, and not a substitute for, the expertise, skill, knowledge and judgment of healthcare practitioners. The absence of a warning for a given drug or drug combination in no way should be construed to indicate that the drug or drug combination is safe, effective or appropriate for any given patient. [facility] does not assume any responsibility for any aspect of healthcare administered with the aid of information [facility] provides. The information contained herein is not intended to cover all possible uses, directions, precautions, warnings, drug interactions, allergic reactions, or adverse effects.  If you have questions about the drugs you are taking, check with your doctor, nurse or pharmacist.  Copyright 4950-8517 [employer]. Version: 7.01. Revision date: 1/27/2021. Care instructions adapted under license by [facility] ([facility]). If you have questions about a medical condition or this instruction, always ask your healthcare professional. [address] any warranty or liability for your use of this information. Anemia: Care Instructions  Your Care Instructions     Anemia is a low level of red blood cells, which carry oxygen throughout your body. Many things can cause anemia. Lack of iron is one of the most common causes. Your body needs iron to make hemoglobin, a substance in red blood cells that carries oxygen from the lungs to your body's cells. Without enough iron, the body produces fewer and smaller red blood cells. As a result, your body's cells do not get enough oxygen, and you feel tired and weak. And you may have trouble concentrating. Bleeding is the most common cause of a lack of iron. You may have heavy menstrual bleeding or bleeding caused by conditions such as ulcers, hemorrhoids, or cancer. Regular use of aspirin or other anti-inflammatory medicines (such as ibuprofen) also can cause bleeding in some people. A lack of iron in your diet also can cause anemia, especially at times when the body needs more iron, such as during pregnancy, infancy, and the teen years. Your doctor may have prescribed iron pills. It may take several months of treatment for your iron levels to return to normal. Your doctor also may suggest that you eat foods that are rich in iron, such as meat and beans. There are many other causes of anemia. It is not always due to a lack of iron. Finding the specific cause of your anemia will help your doctor find the right treatment for you. Follow-up care is a key part of your treatment and safety. Be sure to make and go to all appointments, and call your doctor if you are having problems.  It's also a good idea to know your test results and keep a list of the medicines you take. How can you care for yourself at home? Take your medicines exactly as prescribed. Call your doctor if you think you are having a problem with your medicine. If your doctor recommends iron pills, take them as directed:  Try to take the pills on an empty stomach about 1 hour before or 2 hours after meals. But you may need to take iron with food to avoid an upset stomach. Do not take antacids or drink milk or caffeine drinks (such as coffee, tea, or cola) at the same time or within 2 hours of the time that you take your iron. They can make it hard for your body to absorb the iron. Vitamin C (from food or supplements) helps your body absorb iron. Try taking iron pills with a glass of orange juice or some other food that is high in vitamin C, such as citrus fruits. Iron pills may cause stomach problems, such as heartburn, nausea, diarrhea, constipation, and cramps. Be sure to drink plenty of fluids, and include fruits, vegetables, and fiber in your diet each day. Iron pills often make your bowel movements dark or green. If you forget to take an iron pill, do not take a double dose of iron the next time you take a pill. Keep iron pills out of the reach of small children. An overdose of iron can be very dangerous. Follow your doctor's advice about eating iron-rich foods. These include red meat, shellfish, poultry, eggs, beans, raisins, whole-grain bread, and leafy green vegetables. Steam vegetables to help them keep their iron content. When should you call for help? Call 911 anytime you think you may need emergency care. For example, call if:    You have symptoms of a heart attack. These may include:  Chest pain or pressure, or a strange feeling in the chest.  Sweating. Shortness of breath. Nausea or vomiting. Pain, pressure, or a strange feeling in the back, neck, jaw, or upper belly or in one or both shoulders or arms. Lightheadedness or sudden weakness.   A fast or irregular heartbeat. After you call 911, the  may tell you to chew 1 adult-strength or 2 to 4 low-dose aspirin. Wait for an ambulance. Do not try to drive yourself. You passed out (lost consciousness). Call your doctor now or seek immediate medical care if:    You have new or increased shortness of breath. You are dizzy or lightheaded, or you feel like you may faint. Your fatigue and weakness continue or get worse. You have any abnormal bleeding, such as:  Nosebleeds. Vaginal bleeding that is different (heavier, more frequent, at a different time of the month) than what you are used to. Bloody or black stools, or rectal bleeding. Bloody or pink urine. Watch closely for changes in your health, and be sure to contact your doctor if:    You do not get better as expected. Where can you learn more? Go to https://[email or web address] Path [email or web address]. org and sign in to your [employer] account. Enter R301 in the [employer] Rooms box to learn more about \"Anemia: Care Instructions. \"     If you do not have an account, please click on the \"Sign Up Now\" link. Current as of: November 29, 2021               Content Version: 13.4  © 2006-2022 Healthwise, Incorporated. Care instructions adapted under license by [facility] ([facility]). If you have questions about a medical condition or this instruction, always ask your healthcare professional. [place] [address] any warranty or liability for your use of this information.

## 2022-11-03 NOTE — PROGRESS NOTES
Patient ambulatory to department for first of three doses of venofer. VSS. Pt reports headache after previous infusion. Encouraged increased fluid intake today. Pt v/u. Nohemi Coburn No adverse reaction from previous infusion. IV access obtained. Venofer administered. Pt tolerated venofer infusion well with no adverse rx noted. VSS. Side effects reviewed. Pt v/u. AVS provided. IV removed per Mateus Tucker RN. Nohemi Coburn Pt discharged home. To return next week for same tx.

## 2022-11-03 NOTE — PROGRESS NOTES
Name_______________________________________Printed:____________________  Date and time of surgery___11/16/22 @ 0730_____________________Arrival Time:___0600 main_hosp____________   1. The instructions given regarding when and if a patient needs to stop oral intake prior to surgery varies. Follow the specific instructions you were given                  __x_Nothing to eat or to drink after Midnight the night before.                   ____Carbo loading or ERAS instructions will be given to select patients-if you have been given those instructions -please do the following                           The evening before your surgery after dinner before midnight drink 40 ounces of gatorade. If you are diabetic use sugar free. The morning of surgery drink 40 ounces of water. This needs to be finished 3 hours prior to your surgery start time. 2. Take the following pills with a small sip of water on the morning of surgery________________none___________________________________                  Do not take blood pressure medications ending in pril or sartan the willy prior to surgery or the morning of surgery_   3. Aspirin, Ibuprofen, Advil, Naproxen, Vitamin E and other Anti-inflammatory products and supplements should be stopped for 5 -7days before surgery or as directed by your physician. 4. Check with your Doctor regarding stopping Plavix, Coumadin,Eliquis, Lovenox,Effient,Pradaxa,Xarelto, Fragmin or other blood thinners and follow their instructions. 5. Do not smoke, and do not drink any alcoholic beverages 24 hours prior to surgery. This includes NA Beer. Refrain from the usage of any recreational drugs. 6. You may brush your teeth and gargle the morning of surgery. DO NOT SWALLOW WATER   7. You MUST make arrangements for a responsible adult to stay on site while you are here and take you home after your surgery. You will not be allowed to leave alone or drive yourself home.   It is strongly suggested someone stay with you the first 24 hrs. Your surgery will be cancelled if you do not have a ride home. 8. A parent/legal guardian must accompany a child scheduled for surgery and plan to stay at the hospital until the child is discharged. Please do not bring other children with you. 9. Please wear simple, loose fitting clothing to the hospital.  Benjamine Si not bring valuables (money, credit cards, checkbooks, etc.) Do not wear any makeup (including no eye makeup) or nail polish on your fingers or toes. 10. DO NOT wear any jewelry or piercings on day of surgery. All body piercing jewelry must be removed. 11. If you have ___dentures, they will be removed before going to the OR; we will provide you a container. If you wear ___contact lenses or ___glasses, they will be removed; please bring a case for them. 12. Please see your family doctor/pediatrician for a history & physical and/or concerning medications. Bring any test results/reports from your physician's office. PCP____lee______________Phone___________H&P Appt. Date________             13 If you  have a Living Will and Durable Power of  for Healthcare, please bring in a copy. 15. Notify your Surgeon if you develop any illness between now and surgery  time, cough, cold, fever, sore throat, nausea, vomiting, etc.  Please notify your surgeon if you experience dizziness, shortness of breath or blurred vision between now & the time of your surgery             15. DO NOT shave your operative site 96 hours prior to surgery. For face & neck surgery, men may use an electric razor 48 hours prior to surgery. 16. Shower the night before or morning of surgery using an antibacterial soap or as you have been instructed. 17. To provide excellent care visitors will be limited to one in the room at any given time. 18.  Please bring picture ID and insurance card.              19.  Visit our web site for additional information:  [email or web address]              20.During flu season no children under the age of 15 are permitted in the hospital for the safety of all patients. 21. If you take a long acting insulin in the evening only  take half of your usual  dose the night  before your procedure              22. If you use a c-pap please bring DOS if staying overnight,             23.For your convenience [facility] has a pharmacy on site to fill your prescriptions. 24. If you use oxygen and have a portable tank please bring it  with you the DOS             25. Bring a complete list of all your medications with name and dose include any supplements. 26. Other__________________________________________   *Please call pre admission testing if you any further questions   Jonathan Mcghee   [address] [place] [address]. [facility]  [phone]   [address] [place] [address]       VISITOR POLICY(subject to change)    Current policy is 2 visitors per patient. No children. Mask is  at the discretion of the facility. Visiting hours are 8a-8p. Overnight visitors will be at the discretion of the nurse. All policies subject to change. All above information reviewed with patient in person or by phone. Patient verbalizes understanding. All questions and concerns addressed.                                                                                                  Patient/Rep_______pt_____________                                                                                                                                    PRE OP INSTRUCTIONS 11/16/22

## 2022-11-07 ENCOUNTER — HOSPITAL ENCOUNTER (OUTPATIENT)
Dept: ONCOLOGY | Age: 37
Setting detail: INFUSION SERIES
Discharge: HOME | End: 2022-11-07
Payer: COMMERCIAL

## 2022-11-07 ENCOUNTER — HOSPITAL ENCOUNTER (OUTPATIENT)
Age: 37
Discharge: HOME | End: 2022-11-07
Payer: COMMERCIAL

## 2022-11-07 VITALS
DIASTOLIC BLOOD PRESSURE: 61 MMHG | TEMPERATURE: 98.8 F | HEART RATE: 82 BPM | RESPIRATION RATE: 16 BRPM | SYSTOLIC BLOOD PRESSURE: 100 MMHG

## 2022-11-07 DIAGNOSIS — N92.0 EXCESSIVE OR FREQUENT MENSTRUATION: ICD-10-CM

## 2022-11-07 DIAGNOSIS — D50.0 IRON DEFICIENCY ANEMIA SECONDARY TO BLOOD LOSS (CHRONIC): Primary | ICD-10-CM

## 2022-11-07 DIAGNOSIS — D64.9 ANEMIA, UNSPECIFIED TYPE: ICD-10-CM

## 2022-11-07 DIAGNOSIS — Z01.818 PREOP TESTING: ICD-10-CM

## 2022-11-07 LAB
ABO/RH: NORMAL
ANION GAP SERPL CALCULATED.3IONS-SCNC: 14 MMOL/L (ref 3–16)
ANTIBODY SCREEN: NORMAL
BASOPHILS ABSOLUTE: 0 K/UL (ref 0–0.2)
BASOPHILS RELATIVE PERCENT: 0.4 %
BUN BLDV-MCNC: 5 MG/DL (ref 7–20)
CALCIUM SERPL-MCNC: 9.1 MG/DL (ref 8.3–10.6)
CHLORIDE BLD-SCNC: 108 MMOL/L (ref 99–110)
CO2: 21 MMOL/L (ref 21–32)
CREAT SERPL-MCNC: 0.7 MG/DL (ref 0.6–1.1)
EOSINOPHILS ABSOLUTE: 0.2 K/UL (ref 0–0.6)
EOSINOPHILS RELATIVE PERCENT: 2.8 %
GFR SERPL CREATININE-BSD FRML MDRD: >60 ML/MIN/{1.73_M2}
GLUCOSE BLD-MCNC: 96 MG/DL (ref 70–99)
HCT VFR BLD CALC: 26.8 % (ref 36–48)
HEMOGLOBIN: 8.5 G/DL (ref 12–16)
LYMPHOCYTES ABSOLUTE: 1.1 K/UL (ref 1–5.1)
LYMPHOCYTES RELATIVE PERCENT: 16.9 %
MCH RBC QN AUTO: 28 PG (ref 26–34)
MCHC RBC AUTO-ENTMCNC: 31.6 G/DL (ref 31–36)
MCV RBC AUTO: 88.5 FL (ref 80–100)
MONOCYTES ABSOLUTE: 0.4 K/UL (ref 0–1.3)
MONOCYTES RELATIVE PERCENT: 5.4 %
NEUTROPHILS ABSOLUTE: 5 K/UL (ref 1.7–7.7)
NEUTROPHILS RELATIVE PERCENT: 74.5 %
PDW BLD-RTO: 19 % (ref 12.4–15.4)
PLATELET # BLD: 457 K/UL (ref 135–450)
PMV BLD AUTO: 7.9 FL (ref 5–10.5)
POTASSIUM SERPL-SCNC: 3.9 MMOL/L (ref 3.5–5.1)
RBC # BLD: 3.03 M/UL (ref 4–5.2)
SODIUM BLD-SCNC: 143 MMOL/L (ref 136–145)
WBC # BLD: 6.7 K/UL (ref 4–11)

## 2022-11-07 PROCEDURE — 85025 COMPLETE CBC W/AUTO DIFF WBC: CPT

## 2022-11-07 PROCEDURE — 96365 THER/PROPH/DIAG IV INF INIT: CPT

## 2022-11-07 PROCEDURE — 2580000003 HC RX 258: Performed by: OBSTETRICS & GYNECOLOGY

## 2022-11-07 PROCEDURE — 80048 BASIC METABOLIC PNL TOTAL CA: CPT

## 2022-11-07 PROCEDURE — 86850 RBC ANTIBODY SCREEN: CPT

## 2022-11-07 PROCEDURE — 96366 THER/PROPH/DIAG IV INF ADDON: CPT

## 2022-11-07 PROCEDURE — 99211 OFF/OP EST MAY X REQ PHY/QHP: CPT

## 2022-11-07 PROCEDURE — 86901 BLOOD TYPING SEROLOGIC RH(D): CPT

## 2022-11-07 PROCEDURE — 86900 BLOOD TYPING SEROLOGIC ABO: CPT

## 2022-11-07 PROCEDURE — 36415 COLL VENOUS BLD VENIPUNCTURE: CPT

## 2022-11-07 PROCEDURE — 6360000002 HC RX W HCPCS: Performed by: OBSTETRICS & GYNECOLOGY

## 2022-11-07 RX ORDER — SODIUM CHLORIDE 0.9 % (FLUSH) 0.9 %
5-40 SYRINGE (ML) INJECTION PRN
Status: CANCELLED | OUTPATIENT
Start: 2022-11-07

## 2022-11-07 RX ORDER — SODIUM CHLORIDE 9 MG/ML
INJECTION, SOLUTION INTRAVENOUS CONTINUOUS
Status: DISCONTINUED | OUTPATIENT
Start: 2022-11-07 | End: 2022-11-08 | Stop reason: HOSPADM

## 2022-11-07 RX ORDER — SODIUM CHLORIDE 0.9 % (FLUSH) 0.9 %
5-40 SYRINGE (ML) INJECTION PRN
Status: DISCONTINUED | OUTPATIENT
Start: 2022-11-07 | End: 2022-11-08 | Stop reason: HOSPADM

## 2022-11-07 RX ORDER — SODIUM CHLORIDE 9 MG/ML
INJECTION, SOLUTION INTRAVENOUS CONTINUOUS
Status: CANCELLED | OUTPATIENT
Start: 2022-11-07

## 2022-11-07 RX ADMIN — IRON SUCROSE 300 MG: 20 INJECTION, SOLUTION INTRAVENOUS at 14:07

## 2022-11-07 RX ADMIN — Medication 10 ML: at 14:07

## 2022-11-07 RX ADMIN — SODIUM CHLORIDE: 9 INJECTION, SOLUTION INTRAVENOUS at 14:07

## 2022-11-07 NOTE — PROGRESS NOTES
Patient ambulatory to department for second of three doses of venofer. VSS. Pt reports headache after previous infusion. Encouraged increased fluid intake today. Pt v/u. Pratik Gonsalez No adverse reaction from previous infusion. IV access obtained. Venofer administered. Pt tolerated venofer infusion well with no adverse rx noted. VSS. Side effects reviewed. Pt v/u. AVS provided. Pt discharged home. To return next week for same tx.

## 2022-11-08 NOTE — PROGRESS NOTES
Reviewed Hgb(8.5) and Hct(26.8) done 11/7/22 with Dr. Kd George - order received to do CBC and TXS day of surgery. Notified Amena Parham at Dr. Segovia Mast office.

## 2022-11-11 ENCOUNTER — HOSPITAL ENCOUNTER (OUTPATIENT)
Dept: ONCOLOGY | Age: 37
Setting detail: INFUSION SERIES
Discharge: HOME | End: 2022-11-11
Payer: COMMERCIAL

## 2022-11-11 VITALS
OXYGEN SATURATION: 100 % | RESPIRATION RATE: 14 BRPM | TEMPERATURE: 98.6 F | SYSTOLIC BLOOD PRESSURE: 102 MMHG | DIASTOLIC BLOOD PRESSURE: 69 MMHG | HEART RATE: 80 BPM

## 2022-11-11 DIAGNOSIS — D50.0 IRON DEFICIENCY ANEMIA SECONDARY TO BLOOD LOSS (CHRONIC): Primary | ICD-10-CM

## 2022-11-11 DIAGNOSIS — D64.9 ANEMIA, UNSPECIFIED TYPE: ICD-10-CM

## 2022-11-11 DIAGNOSIS — N92.0 EXCESSIVE OR FREQUENT MENSTRUATION: ICD-10-CM

## 2022-11-11 PROCEDURE — 99211 OFF/OP EST MAY X REQ PHY/QHP: CPT

## 2022-11-11 PROCEDURE — 6360000002 HC RX W HCPCS: Performed by: OBSTETRICS & GYNECOLOGY

## 2022-11-11 PROCEDURE — 96366 THER/PROPH/DIAG IV INF ADDON: CPT

## 2022-11-11 PROCEDURE — 2580000003 HC RX 258: Performed by: OBSTETRICS & GYNECOLOGY

## 2022-11-11 PROCEDURE — 96365 THER/PROPH/DIAG IV INF INIT: CPT

## 2022-11-11 RX ORDER — SODIUM CHLORIDE 9 MG/ML
INJECTION, SOLUTION INTRAVENOUS CONTINUOUS
Status: DISCONTINUED | OUTPATIENT
Start: 2022-11-11 | End: 2022-11-11 | Stop reason: ALTCHOICE

## 2022-11-11 RX ORDER — SODIUM CHLORIDE 0.9 % (FLUSH) 0.9 %
5-40 SYRINGE (ML) INJECTION PRN
Status: CANCELLED | OUTPATIENT
Start: 2022-11-11

## 2022-11-11 RX ORDER — SODIUM CHLORIDE 0.9 % (FLUSH) 0.9 %
5-40 SYRINGE (ML) INJECTION PRN
Status: DISCONTINUED | OUTPATIENT
Start: 2022-11-11 | End: 2022-11-11 | Stop reason: ALTCHOICE

## 2022-11-11 RX ORDER — SODIUM CHLORIDE 9 MG/ML
INJECTION, SOLUTION INTRAVENOUS CONTINUOUS
Status: CANCELLED | OUTPATIENT
Start: 2022-11-11

## 2022-11-11 RX ADMIN — Medication 10 ML: at 11:00

## 2022-11-11 RX ADMIN — IRON SUCROSE 300 MG: 20 INJECTION, SOLUTION INTRAVENOUS at 10:37

## 2022-11-11 RX ADMIN — SODIUM CHLORIDE: 9 INJECTION, SOLUTION INTRAVENOUS at 10:36

## 2022-11-11 ASSESSMENT — PAIN SCALES - GENERAL: PAINLEVEL_OUTOF10: 0

## 2022-11-11 NOTE — PROGRESS NOTES
Patient ambulatory to department for third of three doses of venofer. VSS. Pt reports mild aches after previous infusion. Encouraged increased fluid intake today and tylenol PO if needed. No adverse reaction from previous infusion. IV access obtained. Venofer administered. Pt tolerated venofer infusion well with no adverse rx noted. VSS. Side effects reviewed. AVS provided and denied need for hard copy. Pt discharged home.

## 2022-11-11 NOTE — DISCHARGE INSTR - COC
Continuity of Care Form    Patient Name: Gayatri Corral   :  1985  MRN:  [id number]    Admit date:  2022  Discharge date:  ***    Code Status Order: Prior   Advance Directives:     Admitting Physician:  No admitting provider for patient encounter. PCP: No primary care provider on file. Discharging Nurse: [facility] Unit/Room#: No information available for this encounter. Discharging Unit Phone Number: ***    Emergency Contact:   Extended Emergency Contact Information  Primary Emergency Contact: Shelley Weinstein  Home Phone: [phone]  Relation: Aunt/Uncle  Secondary Emergency Contact: Joan Rios  Home Phone: [phone]  Relation: Parent   needed? No    Past Surgical History:  No past surgical history on file. Immunization History: There is no immunization history on file for this patient.     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    Excessive or frequent menstruation N92.0       Isolation/Infection:   Isolation            No Isolation          Patient Infection Status       None to display            Nurse Assessment:  Last Vital Signs: /69   Pulse 80   Temp 98.6 °F (37 °C) (Temporal)   Resp 14   SpO2 100%     Last documented pain score (0-10 scale): Pain Level: 0  Last Weight:   Wt Readings from Last 1 Encounters:   22 143 lb 8.3 oz (65.1 kg)     Mental Status:  {IP PT MENTAL STATUS:05370}    IV Access:  { JON IV ACCESS:252111989}    Nursing Mobility/ADLs:  Walking   {CHP DME DDXI:975209511}  Transfer  {CHP DME KJZF:783693317}  Bathing  {CHP DME TCYH:948418237}  Dressing  {CHP DME NITZ:562906418}  Toileting  {CHP DME YKXF:203392389}  Feeding  {CHP DME FTR}  Med Admin  {CHP DME MQAA:290742266}  Med Delivery   { JON MED Delivery:482259670}    Wound Care Documentation and Therapy: Elimination:  Continence: Bowel: {YES / HF:48529}  Bladder: {YES / OV:50967}  Urinary Catheter: {Urinary Catheter:872662641}   Colostomy/Ileostomy/Ileal Conduit: {YES / FU:16760}       Date of Last BM: ***  No intake or output data in the 24 hours ending 22 1059  No intake/output data recorded.     Safety Concerns:     [address] JON Safety Concerns:953909357}    Impairments/Disabilities:      [address] [facility] Impairments/Disabilities:707059865}    Nutrition Therapy:  Current Nutrition Therapy:   [address] [facility] Diet List:809069589}    Routes of Feeding: {CHP DME Other Feedings:316568601}  Liquids: {Slp liquid thickness:09192}  Daily Fluid Restriction: {CHP DME Yes amt example:049854539}  Last Modified Barium Swallow with Video (Video Swallowing Test): {Done Not Done GFRU:438473155}    Treatments at the Time of Hospital Discharge:   Respiratory Treatments: ***  Oxygen Therapy:  {Therapy; copd oxygen:26549}  Ventilator:    { CC Vent LSBF:508382455}    Rehab Therapies: {THERAPEUTIC INTERVENTION:3082698269}  Weight Bearing Status/Restrictions: [address]  Weight Bearin}  Other Medical Equipment (for information only, NOT a DME order):  {EQUIPMENT:712189632}  Other Treatments: ***    Patient's personal belongings (please select all that are sent with patient):  {[facility] DME Belongings:494633146}    RN SIGNATURE:  {Esignature:936699859}    CASE MANAGEMENT/SOCIAL WORK SECTION    Inpatient Status Date: ***    Readmission Risk Assessment Score:  Readmission Risk              Risk of Unplanned Readmission:  0           Discharging to Facility/ Agency   Name:   Address:  Phone:  Fax:    Dialysis Facility (if applicable)   Name:  Address:  Dialysis Schedule:  Phone:  Fax:    / signature: {Esignature:158804120}    PHYSICIAN SECTION    Prognosis: {Prognosis:9344138468}    Condition at Discharge: [address] Patient Condition:248623721}    Rehab Potential (if transferring to Rehab): {Prognosis:2919459654}    Recommended Labs or Other Treatments After Discharge: ***    Physician Certification: I certify the above information and transfer of Eladio Saldana  is necessary for the continuing treatment of the diagnosis listed and that she requires {Admit to Appropriate Level of Care:24135} for {GREATER/LESS:728334163} 30 days.      Update Admission H&P: {CHP DME Changes in GEWQI:330852954}    PHYSICIAN SIGNATURE:  {Esignature:531462026}

## 2022-11-15 ENCOUNTER — ANESTHESIA EVENT (OUTPATIENT)
Dept: OPERATING ROOM | Age: 37
End: 2022-11-15
Payer: COMMERCIAL

## 2022-11-16 ENCOUNTER — HOSPITAL ENCOUNTER (OUTPATIENT)
Age: 37
Discharge: HOME | End: 2022-11-17
Attending: OBSTETRICS & GYNECOLOGY | Admitting: OBSTETRICS & GYNECOLOGY
Payer: COMMERCIAL

## 2022-11-16 ENCOUNTER — ANESTHESIA (OUTPATIENT)
Dept: OPERATING ROOM | Age: 37
End: 2022-11-16
Payer: COMMERCIAL

## 2022-11-16 DIAGNOSIS — D50.0 ANEMIA, BLOOD LOSS: ICD-10-CM

## 2022-11-16 DIAGNOSIS — N92.1 MENOMETRORRHAGIA: ICD-10-CM

## 2022-11-16 DIAGNOSIS — D25.9 UTERINE LEIOMYOMA, UNSPECIFIED LOCATION: ICD-10-CM

## 2022-11-16 DIAGNOSIS — G89.18 POST-OP PAIN: ICD-10-CM

## 2022-11-16 DIAGNOSIS — Z01.818 PREOP TESTING: Primary | ICD-10-CM

## 2022-11-16 PROBLEM — Z98.890 S/P MYOMECTOMY: Status: ACTIVE | Noted: 2022-11-16

## 2022-11-16 LAB
ABO/RH: NORMAL
ANTIBODY SCREEN: NORMAL
HCG(URINE) PREGNANCY TEST: NEGATIVE
HCT VFR BLD CALC: 27.8 % (ref 36–48)
HEMOGLOBIN: 8.4 G/DL (ref 12–16)
MCH RBC QN AUTO: 27.4 PG (ref 26–34)
MCHC RBC AUTO-ENTMCNC: 30.2 G/DL (ref 31–36)
MCV RBC AUTO: 90.5 FL (ref 80–100)
PDW BLD-RTO: 18.6 % (ref 12.4–15.4)
PLATELET # BLD: 358 K/UL (ref 135–450)
PMV BLD AUTO: 7.5 FL (ref 5–10.5)
RBC # BLD: 3.07 M/UL (ref 4–5.2)
WBC # BLD: 3.1 K/UL (ref 4–11)

## 2022-11-16 PROCEDURE — 1220000000 HC SEMI PRIVATE OB R&B

## 2022-11-16 PROCEDURE — 86923 COMPATIBILITY TEST ELECTRIC: CPT

## 2022-11-16 PROCEDURE — 36415 COLL VENOUS BLD VENIPUNCTURE: CPT

## 2022-11-16 PROCEDURE — 2580000003 HC RX 258: Performed by: OBSTETRICS & GYNECOLOGY

## 2022-11-16 PROCEDURE — 6360000002 HC RX W HCPCS: Performed by: OBSTETRICS & GYNECOLOGY

## 2022-11-16 PROCEDURE — 86901 BLOOD TYPING SEROLOGIC RH(D): CPT

## 2022-11-16 PROCEDURE — 6360000002 HC RX W HCPCS: Performed by: REGISTERED NURSE

## 2022-11-16 PROCEDURE — 7100000001 HC PACU RECOVERY - ADDTL 15 MIN: Performed by: OBSTETRICS & GYNECOLOGY

## 2022-11-16 PROCEDURE — 3700000001 HC ADD 15 MINUTES (ANESTHESIA): Performed by: OBSTETRICS & GYNECOLOGY

## 2022-11-16 PROCEDURE — 85027 COMPLETE CBC AUTOMATED: CPT

## 2022-11-16 PROCEDURE — 88305 TISSUE EXAM BY PATHOLOGIST: CPT

## 2022-11-16 PROCEDURE — 86850 RBC ANTIBODY SCREEN: CPT

## 2022-11-16 PROCEDURE — 3600000013 HC SURGERY LEVEL 3 ADDTL 15MIN: Performed by: OBSTETRICS & GYNECOLOGY

## 2022-11-16 PROCEDURE — 86900 BLOOD TYPING SEROLOGIC ABO: CPT

## 2022-11-16 PROCEDURE — 2500000003 HC RX 250 WO HCPCS: Performed by: OBSTETRICS & GYNECOLOGY

## 2022-11-16 PROCEDURE — 2709999900 HC NON-CHARGEABLE SUPPLY: Performed by: OBSTETRICS & GYNECOLOGY

## 2022-11-16 PROCEDURE — P9016 RBC LEUKOCYTES REDUCED: HCPCS

## 2022-11-16 PROCEDURE — 6370000000 HC RX 637 (ALT 250 FOR IP): Performed by: OBSTETRICS & GYNECOLOGY

## 2022-11-16 PROCEDURE — 2500000003 HC RX 250 WO HCPCS: Performed by: ANESTHESIOLOGY

## 2022-11-16 PROCEDURE — 84703 CHORIONIC GONADOTROPIN ASSAY: CPT

## 2022-11-16 PROCEDURE — 6360000002 HC RX W HCPCS: Performed by: ANESTHESIOLOGY

## 2022-11-16 PROCEDURE — A4217 STERILE WATER/SALINE, 500 ML: HCPCS | Performed by: OBSTETRICS & GYNECOLOGY

## 2022-11-16 PROCEDURE — 3600000003 HC SURGERY LEVEL 3 BASE: Performed by: OBSTETRICS & GYNECOLOGY

## 2022-11-16 PROCEDURE — 7100000000 HC PACU RECOVERY - FIRST 15 MIN: Performed by: OBSTETRICS & GYNECOLOGY

## 2022-11-16 PROCEDURE — 64488 TAP BLOCK BI INJECTION: CPT | Performed by: ANESTHESIOLOGY

## 2022-11-16 PROCEDURE — 2500000003 HC RX 250 WO HCPCS: Performed by: REGISTERED NURSE

## 2022-11-16 PROCEDURE — 3700000000 HC ANESTHESIA ATTENDED CARE: Performed by: OBSTETRICS & GYNECOLOGY

## 2022-11-16 PROCEDURE — 2580000003 HC RX 258: Performed by: REGISTERED NURSE

## 2022-11-16 RX ORDER — OXYCODONE HYDROCHLORIDE 5 MG/1
5 TABLET ORAL
Status: DISCONTINUED | OUTPATIENT
Start: 2022-11-16 | End: 2022-11-16 | Stop reason: HOSPADM

## 2022-11-16 RX ORDER — BUPIVACAINE HYDROCHLORIDE 2.5 MG/ML
INJECTION, SOLUTION EPIDURAL; INFILTRATION; INTRACAUDAL
Status: DISCONTINUED | OUTPATIENT
Start: 2022-11-16 | End: 2022-11-16 | Stop reason: SDUPTHER

## 2022-11-16 RX ORDER — LIDOCAINE HYDROCHLORIDE 10 MG/ML
1 INJECTION, SOLUTION EPIDURAL; INFILTRATION; INTRACAUDAL; PERINEURAL
Status: DISCONTINUED | OUTPATIENT
Start: 2022-11-16 | End: 2022-11-16 | Stop reason: HOSPADM

## 2022-11-16 RX ORDER — ONDANSETRON 2 MG/ML
4 INJECTION INTRAMUSCULAR; INTRAVENOUS
Status: DISCONTINUED | OUTPATIENT
Start: 2022-11-16 | End: 2022-11-16 | Stop reason: HOSPADM

## 2022-11-16 RX ORDER — MIDAZOLAM HYDROCHLORIDE 1 MG/ML
INJECTION INTRAMUSCULAR; INTRAVENOUS PRN
Status: DISCONTINUED | OUTPATIENT
Start: 2022-11-16 | End: 2022-11-16 | Stop reason: SDUPTHER

## 2022-11-16 RX ORDER — ONDANSETRON 2 MG/ML
4 INJECTION INTRAMUSCULAR; INTRAVENOUS EVERY 6 HOURS PRN
Status: DISCONTINUED | OUTPATIENT
Start: 2022-11-16 | End: 2022-11-17 | Stop reason: HOSPADM

## 2022-11-16 RX ORDER — DEXAMETHASONE SODIUM PHOSPHATE 4 MG/ML
INJECTION, SOLUTION INTRA-ARTICULAR; INTRALESIONAL; INTRAMUSCULAR; INTRAVENOUS; SOFT TISSUE PRN
Status: DISCONTINUED | OUTPATIENT
Start: 2022-11-16 | End: 2022-11-16 | Stop reason: SDUPTHER

## 2022-11-16 RX ORDER — LIDOCAINE HYDROCHLORIDE 20 MG/ML
INJECTION, SOLUTION EPIDURAL; INFILTRATION; INTRACAUDAL; PERINEURAL PRN
Status: DISCONTINUED | OUTPATIENT
Start: 2022-11-16 | End: 2022-11-16 | Stop reason: SDUPTHER

## 2022-11-16 RX ORDER — IBUPROFEN 800 MG/1
800 TABLET ORAL EVERY 6 HOURS PRN
Qty: 50 TABLET | Refills: 3 | Status: SHIPPED | OUTPATIENT
Start: 2022-11-16

## 2022-11-16 RX ORDER — BUPIVACAINE HYDROCHLORIDE AND EPINEPHRINE 5; 5 MG/ML; UG/ML
INJECTION, SOLUTION EPIDURAL; INTRACAUDAL; PERINEURAL
Status: DISCONTINUED | OUTPATIENT
Start: 2022-11-16 | End: 2022-11-16 | Stop reason: ALTCHOICE

## 2022-11-16 RX ORDER — ROCURONIUM BROMIDE 10 MG/ML
INJECTION, SOLUTION INTRAVENOUS PRN
Status: DISCONTINUED | OUTPATIENT
Start: 2022-11-16 | End: 2022-11-16 | Stop reason: SDUPTHER

## 2022-11-16 RX ORDER — SODIUM CHLORIDE 0.9 % (FLUSH) 0.9 %
5-40 SYRINGE (ML) INJECTION EVERY 12 HOURS SCHEDULED
Status: DISCONTINUED | OUTPATIENT
Start: 2022-11-16 | End: 2022-11-17 | Stop reason: HOSPADM

## 2022-11-16 RX ORDER — SODIUM CHLORIDE 0.9 % (FLUSH) 0.9 %
5-40 SYRINGE (ML) INJECTION EVERY 12 HOURS SCHEDULED
Status: DISCONTINUED | OUTPATIENT
Start: 2022-11-16 | End: 2022-11-16 | Stop reason: HOSPADM

## 2022-11-16 RX ORDER — SODIUM CHLORIDE 9 MG/ML
INJECTION, SOLUTION INTRAVENOUS PRN
Status: DISCONTINUED | OUTPATIENT
Start: 2022-11-16 | End: 2022-11-16 | Stop reason: HOSPADM

## 2022-11-16 RX ORDER — KETOROLAC TROMETHAMINE 30 MG/ML
30 INJECTION, SOLUTION INTRAMUSCULAR; INTRAVENOUS EVERY 6 HOURS
Status: DISCONTINUED | OUTPATIENT
Start: 2022-11-16 | End: 2022-11-17 | Stop reason: HOSPADM

## 2022-11-16 RX ORDER — ACETAMINOPHEN 500 MG
500 TABLET ORAL 4 TIMES DAILY PRN
Qty: 30 TABLET | Refills: 1 | Status: SHIPPED | OUTPATIENT
Start: 2022-11-16

## 2022-11-16 RX ORDER — SODIUM CHLORIDE, SODIUM LACTATE, POTASSIUM CHLORIDE, CALCIUM CHLORIDE 600; 310; 30; 20 MG/100ML; MG/100ML; MG/100ML; MG/100ML
INJECTION, SOLUTION INTRAVENOUS CONTINUOUS
Status: DISCONTINUED | OUTPATIENT
Start: 2022-11-16 | End: 2022-11-16

## 2022-11-16 RX ORDER — SODIUM CHLORIDE 9 MG/ML
INJECTION, SOLUTION INTRAVENOUS PRN
Status: DISCONTINUED | OUTPATIENT
Start: 2022-11-16 | End: 2022-11-17 | Stop reason: HOSPADM

## 2022-11-16 RX ORDER — NALOXONE HYDROCHLORIDE 0.4 MG/ML
INJECTION, SOLUTION INTRAMUSCULAR; INTRAVENOUS; SUBCUTANEOUS PRN
Status: DISCONTINUED | OUTPATIENT
Start: 2022-11-16 | End: 2022-11-16

## 2022-11-16 RX ORDER — MAGNESIUM HYDROXIDE 1200 MG/15ML
LIQUID ORAL CONTINUOUS PRN
Status: COMPLETED | OUTPATIENT
Start: 2022-11-16 | End: 2022-11-16

## 2022-11-16 RX ORDER — FENTANYL CITRATE 50 UG/ML
INJECTION, SOLUTION INTRAMUSCULAR; INTRAVENOUS PRN
Status: DISCONTINUED | OUTPATIENT
Start: 2022-11-16 | End: 2022-11-16 | Stop reason: SDUPTHER

## 2022-11-16 RX ORDER — SODIUM CHLORIDE 0.9 % (FLUSH) 0.9 %
5-40 SYRINGE (ML) INJECTION PRN
Status: DISCONTINUED | OUTPATIENT
Start: 2022-11-16 | End: 2022-11-17 | Stop reason: HOSPADM

## 2022-11-16 RX ORDER — LIDOCAINE HYDROCHLORIDE 10 MG/ML
0.5 INJECTION, SOLUTION EPIDURAL; INFILTRATION; INTRACAUDAL; PERINEURAL ONCE
Status: DISCONTINUED | OUTPATIENT
Start: 2022-11-16 | End: 2022-11-16 | Stop reason: HOSPADM

## 2022-11-16 RX ORDER — MAGNESIUM SULFATE HEPTAHYDRATE 500 MG/ML
INJECTION, SOLUTION INTRAMUSCULAR; INTRAVENOUS PRN
Status: DISCONTINUED | OUTPATIENT
Start: 2022-11-16 | End: 2022-11-16 | Stop reason: SDUPTHER

## 2022-11-16 RX ORDER — HYDROMORPHONE HCL 110MG/55ML
0.5 PATIENT CONTROLLED ANALGESIA SYRINGE INTRAVENOUS EVERY 5 MIN PRN
Status: DISCONTINUED | OUTPATIENT
Start: 2022-11-16 | End: 2022-11-16 | Stop reason: HOSPADM

## 2022-11-16 RX ORDER — MEPERIDINE HYDROCHLORIDE 25 MG/ML
12.5 INJECTION INTRAMUSCULAR; INTRAVENOUS; SUBCUTANEOUS EVERY 5 MIN PRN
Status: DISCONTINUED | OUTPATIENT
Start: 2022-11-16 | End: 2022-11-16 | Stop reason: HOSPADM

## 2022-11-16 RX ORDER — IBUPROFEN 800 MG/1
800 TABLET ORAL EVERY 6 HOURS PRN
Status: DISCONTINUED | OUTPATIENT
Start: 2022-11-16 | End: 2022-11-17 | Stop reason: HOSPADM

## 2022-11-16 RX ORDER — TRANEXAMIC ACID 100 MG/ML
INJECTION, SOLUTION INTRAVENOUS PRN
Status: DISCONTINUED | OUTPATIENT
Start: 2022-11-16 | End: 2022-11-16 | Stop reason: SDUPTHER

## 2022-11-16 RX ORDER — ONDANSETRON 2 MG/ML
INJECTION INTRAMUSCULAR; INTRAVENOUS PRN
Status: DISCONTINUED | OUTPATIENT
Start: 2022-11-16 | End: 2022-11-16 | Stop reason: SDUPTHER

## 2022-11-16 RX ORDER — POLYETHYLENE GLYCOL 3350 17 G/17G
17 POWDER, FOR SOLUTION ORAL DAILY PRN
Status: DISCONTINUED | OUTPATIENT
Start: 2022-11-16 | End: 2022-11-16

## 2022-11-16 RX ORDER — DOCUSATE SODIUM 100 MG/1
100 CAPSULE, LIQUID FILLED ORAL 2 TIMES DAILY
Qty: 30 CAPSULE | Refills: 0 | Status: SHIPPED | OUTPATIENT
Start: 2022-11-16 | End: 2022-12-01

## 2022-11-16 RX ORDER — LANOLIN ALCOHOL/MO/W.PET/CERES
3 CREAM (GRAM) TOPICAL NIGHTLY PRN
Status: DISCONTINUED | OUTPATIENT
Start: 2022-11-16 | End: 2022-11-17 | Stop reason: HOSPADM

## 2022-11-16 RX ORDER — OXYCODONE HYDROCHLORIDE 5 MG/1
5 TABLET ORAL EVERY 4 HOURS PRN
Status: DISCONTINUED | OUTPATIENT
Start: 2022-11-16 | End: 2022-11-17 | Stop reason: HOSPADM

## 2022-11-16 RX ORDER — HYDRALAZINE HYDROCHLORIDE 20 MG/ML
10 INJECTION INTRAMUSCULAR; INTRAVENOUS
Status: DISCONTINUED | OUTPATIENT
Start: 2022-11-16 | End: 2022-11-16 | Stop reason: HOSPADM

## 2022-11-16 RX ORDER — ONDANSETRON 4 MG/1
4 TABLET, ORALLY DISINTEGRATING ORAL EVERY 8 HOURS PRN
Status: DISCONTINUED | OUTPATIENT
Start: 2022-11-16 | End: 2022-11-17 | Stop reason: HOSPADM

## 2022-11-16 RX ORDER — OXYCODONE HYDROCHLORIDE 5 MG/1
5 TABLET ORAL EVERY 6 HOURS PRN
Qty: 28 TABLET | Refills: 0 | Status: SHIPPED | OUTPATIENT
Start: 2022-11-16 | End: 2022-11-23

## 2022-11-16 RX ORDER — KETAMINE HCL IN NACL, ISO-OSM 100MG/10ML
SYRINGE (ML) INJECTION PRN
Status: DISCONTINUED | OUTPATIENT
Start: 2022-11-16 | End: 2022-11-16 | Stop reason: SDUPTHER

## 2022-11-16 RX ORDER — HYDROMORPHONE HCL 110MG/55ML
1 PATIENT CONTROLLED ANALGESIA SYRINGE INTRAVENOUS
Status: DISCONTINUED | OUTPATIENT
Start: 2022-11-16 | End: 2022-11-16

## 2022-11-16 RX ORDER — DEXTROSE AND SODIUM CHLORIDE 5; .45 G/100ML; G/100ML
INJECTION, SOLUTION INTRAVENOUS CONTINUOUS
Status: DISCONTINUED | OUTPATIENT
Start: 2022-11-16 | End: 2022-11-17 | Stop reason: HOSPADM

## 2022-11-16 RX ORDER — SODIUM CHLORIDE 9 MG/ML
INJECTION, SOLUTION INTRAVENOUS PRN
Status: DISCONTINUED | OUTPATIENT
Start: 2022-11-16 | End: 2022-11-16

## 2022-11-16 RX ORDER — SODIUM CHLORIDE 0.9 % (FLUSH) 0.9 %
5-40 SYRINGE (ML) INJECTION PRN
Status: DISCONTINUED | OUTPATIENT
Start: 2022-11-16 | End: 2022-11-16 | Stop reason: HOSPADM

## 2022-11-16 RX ORDER — SODIUM CHLORIDE, SODIUM LACTATE, POTASSIUM CHLORIDE, CALCIUM CHLORIDE 600; 310; 30; 20 MG/100ML; MG/100ML; MG/100ML; MG/100ML
INJECTION, SOLUTION INTRAVENOUS CONTINUOUS PRN
Status: DISCONTINUED | OUTPATIENT
Start: 2022-11-16 | End: 2022-11-16 | Stop reason: SDUPTHER

## 2022-11-16 RX ORDER — LABETALOL HYDROCHLORIDE 5 MG/ML
10 INJECTION, SOLUTION INTRAVENOUS
Status: DISCONTINUED | OUTPATIENT
Start: 2022-11-16 | End: 2022-11-16 | Stop reason: HOSPADM

## 2022-11-16 RX ORDER — PROPOFOL 10 MG/ML
INJECTION, EMULSION INTRAVENOUS PRN
Status: DISCONTINUED | OUTPATIENT
Start: 2022-11-16 | End: 2022-11-16 | Stop reason: SDUPTHER

## 2022-11-16 RX ORDER — VASOPRESSIN 20 [USP'U]/ML
INJECTION, SOLUTION INTRAMUSCULAR; SUBCUTANEOUS
Status: COMPLETED | OUTPATIENT
Start: 2022-11-16 | End: 2022-11-16

## 2022-11-16 RX ORDER — OXYCODONE HYDROCHLORIDE 5 MG/1
10 TABLET ORAL EVERY 4 HOURS PRN
Status: DISCONTINUED | OUTPATIENT
Start: 2022-11-16 | End: 2022-11-17 | Stop reason: HOSPADM

## 2022-11-16 RX ORDER — SODIUM CHLORIDE, SODIUM LACTATE, POTASSIUM CHLORIDE, CALCIUM CHLORIDE 600; 310; 30; 20 MG/100ML; MG/100ML; MG/100ML; MG/100ML
INJECTION, SOLUTION INTRAVENOUS CONTINUOUS
Status: DISCONTINUED | OUTPATIENT
Start: 2022-11-16 | End: 2022-11-16 | Stop reason: HOSPADM

## 2022-11-16 RX ORDER — SUCCINYLCHOLINE/SOD CL,ISO/PF 200MG/10ML
SYRINGE (ML) INTRAVENOUS PRN
Status: DISCONTINUED | OUTPATIENT
Start: 2022-11-16 | End: 2022-11-16 | Stop reason: SDUPTHER

## 2022-11-16 RX ORDER — KETOROLAC TROMETHAMINE 30 MG/ML
INJECTION, SOLUTION INTRAMUSCULAR; INTRAVENOUS PRN
Status: DISCONTINUED | OUTPATIENT
Start: 2022-11-16 | End: 2022-11-16 | Stop reason: SDUPTHER

## 2022-11-16 RX ORDER — HYDROMORPHONE HYDROCHLORIDE 1 MG/ML
1 INJECTION, SOLUTION INTRAMUSCULAR; INTRAVENOUS; SUBCUTANEOUS
Status: DISCONTINUED | OUTPATIENT
Start: 2022-11-16 | End: 2022-11-17 | Stop reason: HOSPADM

## 2022-11-16 RX ADMIN — KETOROLAC TROMETHAMINE 30 MG: 60 INJECTION, SOLUTION INTRAMUSCULAR at 08:38

## 2022-11-16 RX ADMIN — Medication 120 MG: at 07:36

## 2022-11-16 RX ADMIN — SODIUM CHLORIDE, PRESERVATIVE FREE 10 ML: 5 INJECTION INTRAVENOUS at 21:06

## 2022-11-16 RX ADMIN — LIDOCAINE HYDROCHLORIDE 100 MG: 20 INJECTION, SOLUTION EPIDURAL; INFILTRATION; INTRACAUDAL; PERINEURAL at 07:36

## 2022-11-16 RX ADMIN — DEXTROSE AND SODIUM CHLORIDE: 5; 450 INJECTION, SOLUTION INTRAVENOUS at 13:46

## 2022-11-16 RX ADMIN — SUGAMMADEX 200 MG: 100 INJECTION, SOLUTION INTRAVENOUS at 08:37

## 2022-11-16 RX ADMIN — BUPIVACAINE HYDROCHLORIDE 30 ML: 2.5 INJECTION, SOLUTION EPIDURAL; INFILTRATION; INTRACAUDAL; PERINEURAL at 08:40

## 2022-11-16 RX ADMIN — SODIUM CHLORIDE, PRESERVATIVE FREE 10 ML: 5 INJECTION INTRAVENOUS at 14:41

## 2022-11-16 RX ADMIN — MIDAZOLAM 2 MG: 1 INJECTION INTRAMUSCULAR; INTRAVENOUS at 07:29

## 2022-11-16 RX ADMIN — CEFAZOLIN 2000 MG: 2 INJECTION, POWDER, FOR SOLUTION INTRAMUSCULAR; INTRAVENOUS at 07:32

## 2022-11-16 RX ADMIN — CEFOXITIN SODIUM 1000 MG: 1 POWDER, FOR SOLUTION INTRAVENOUS at 14:45

## 2022-11-16 RX ADMIN — HYDROMORPHONE HYDROCHLORIDE 0.5 MG: 2 INJECTION, SOLUTION INTRAMUSCULAR; INTRAVENOUS; SUBCUTANEOUS at 09:27

## 2022-11-16 RX ADMIN — Medication: at 09:44

## 2022-11-16 RX ADMIN — ROCURONIUM BROMIDE 50 MG: 10 INJECTION, SOLUTION INTRAVENOUS at 07:44

## 2022-11-16 RX ADMIN — KETOROLAC TROMETHAMINE 30 MG: 30 INJECTION, SOLUTION INTRAMUSCULAR at 21:03

## 2022-11-16 RX ADMIN — SODIUM CHLORIDE, POTASSIUM CHLORIDE, SODIUM LACTATE AND CALCIUM CHLORIDE: 600; 310; 30; 20 INJECTION, SOLUTION INTRAVENOUS at 06:55

## 2022-11-16 RX ADMIN — CEFOXITIN SODIUM 1000 MG: 1 POWDER, FOR SOLUTION INTRAVENOUS at 21:40

## 2022-11-16 RX ADMIN — OXYCODONE 10 MG: 5 TABLET ORAL at 13:41

## 2022-11-16 RX ADMIN — MAGNESIUM SULFATE HEPTAHYDRATE 1 G: 500 INJECTION, SOLUTION INTRAMUSCULAR; INTRAVENOUS at 07:44

## 2022-11-16 RX ADMIN — DEXAMETHASONE SODIUM PHOSPHATE 10 MG: 4 INJECTION, SOLUTION INTRAMUSCULAR; INTRAVENOUS at 07:44

## 2022-11-16 RX ADMIN — OXYCODONE 10 MG: 5 TABLET ORAL at 23:14

## 2022-11-16 RX ADMIN — HYDROMORPHONE HYDROCHLORIDE 0.5 MG: 2 INJECTION, SOLUTION INTRAMUSCULAR; INTRAVENOUS; SUBCUTANEOUS at 09:21

## 2022-11-16 RX ADMIN — DEXTROSE AND SODIUM CHLORIDE: 5; 450 INJECTION, SOLUTION INTRAVENOUS at 22:00

## 2022-11-16 RX ADMIN — TRANEXAMIC ACID 1000 MG: 1 INJECTION, SOLUTION INTRAVENOUS at 07:50

## 2022-11-16 RX ADMIN — PROPOFOL 200 MG: 10 INJECTION, EMULSION INTRAVENOUS at 07:36

## 2022-11-16 RX ADMIN — FENTANYL CITRATE 25 MCG: 50 INJECTION, SOLUTION INTRAMUSCULAR; INTRAVENOUS at 07:52

## 2022-11-16 RX ADMIN — FENTANYL CITRATE 25 MCG: 50 INJECTION, SOLUTION INTRAMUSCULAR; INTRAVENOUS at 08:42

## 2022-11-16 RX ADMIN — SODIUM CHLORIDE, POTASSIUM CHLORIDE, SODIUM LACTATE AND CALCIUM CHLORIDE: 600; 310; 30; 20 INJECTION, SOLUTION INTRAVENOUS at 07:30

## 2022-11-16 RX ADMIN — ONDANSETRON 4 MG: 2 INJECTION INTRAMUSCULAR; INTRAVENOUS at 07:44

## 2022-11-16 RX ADMIN — FENTANYL CITRATE 50 MCG: 50 INJECTION, SOLUTION INTRAMUSCULAR; INTRAVENOUS at 07:36

## 2022-11-16 RX ADMIN — KETOROLAC TROMETHAMINE 30 MG: 30 INJECTION, SOLUTION INTRAMUSCULAR at 14:40

## 2022-11-16 RX ADMIN — ONDANSETRON 4 MG: 2 INJECTION INTRAMUSCULAR; INTRAVENOUS at 18:46

## 2022-11-16 RX ADMIN — HYDROMORPHONE HYDROCHLORIDE 0.5 MG: 2 INJECTION, SOLUTION INTRAMUSCULAR; INTRAVENOUS; SUBCUTANEOUS at 10:12

## 2022-11-16 RX ADMIN — Medication 30 MG: at 07:44

## 2022-11-16 ASSESSMENT — PAIN SCALES - GENERAL
PAINLEVEL_OUTOF10: 8
PAINLEVEL_OUTOF10: 10
PAINLEVEL_OUTOF10: 7
PAINLEVEL_OUTOF10: 4
PAINLEVEL_OUTOF10: 9
PAINLEVEL_OUTOF10: 7
PAINLEVEL_OUTOF10: 4
PAINLEVEL_OUTOF10: 6

## 2022-11-16 ASSESSMENT — PAIN DESCRIPTION - LOCATION
LOCATION: INCISION
LOCATION: BACK
LOCATION: ABDOMEN
LOCATION: BACK
LOCATION: INCISION
LOCATION: INCISION

## 2022-11-16 ASSESSMENT — PAIN DESCRIPTION - DESCRIPTORS
DESCRIPTORS: ACHING
DESCRIPTORS: ACHING
DESCRIPTORS: SORE
DESCRIPTORS: SORE
DESCRIPTORS: SHARP

## 2022-11-16 ASSESSMENT — PAIN - FUNCTIONAL ASSESSMENT
PAIN_FUNCTIONAL_ASSESSMENT: 0-10
PAIN_FUNCTIONAL_ASSESSMENT: ACTIVITIES ARE NOT PREVENTED

## 2022-11-16 ASSESSMENT — PAIN DESCRIPTION - ORIENTATION
ORIENTATION: LOWER

## 2022-11-16 ASSESSMENT — LIFESTYLE VARIABLES: SMOKING_STATUS: 1

## 2022-11-16 NOTE — H&P
Date of Surgery Update: Tish Person was seen, history and physical examination reviewed, and patient examined by me today. There have been no significant clinical changes since the completion of the previous history and physical.    The risk, benefits, and alternatives of the proposed procedure have been explained to the patient (or appropriate guardian) and understanding verbalized. All questions answered. Patient wishes to proceed.     Electronically signed by: Dee Dee Morrow MD,11/16/2022,7:17 AM

## 2022-11-16 NOTE — ANESTHESIA PRE PROCEDURE
Department of Anesthesiology  Preprocedure Note       Name:  Sadaf Rosa   Age:  40 y.o.  :  1985                                          MRN:  [id number]         Date:  2022      Surgeon: Pricila Washington):  Tahir Jane MD    Procedure: Procedure(s):  OPEN MYOMECTOMY - IV SEDATION    Medications prior to admission:   Prior to Admission medications    Medication Sig Start Date End Date Taking? Authorizing Provider   naproxen (NAPROSYN) 500 MG tablet Take 500 mg by mouth 2 times daily (with meals)  Patient not taking: Reported on 2022   Yes Historical Provider, MD   medroxyPROGESTERone (PROVERA) 10 MG tablet Take 30 mg by mouth in the morning and at bedtime Takes less if not bleeding   Yes Historical Provider, MD   ferrous sulfate (IRON 325) 325 (65 Fe) MG tablet Take 1 tablet by mouth 2 times daily 22   Tahir Jane MD       Current medications:    Current Facility-Administered Medications   Medication Dose Route Frequency Provider Last Rate Last Admin    lactated ringers infusion   IntraVENous Continuous Tahir Jane MD        lidocaine PF 1 % injection 0.5 mL  0.5 mL IntraDERmal Once Tahir Jane MD        ceFAZolin (ANCEF) 2,000 mg in dextrose 5 % 50 mL IVPB (mini-bag)  2,000 mg IntraVENous On Call to [facility],[address], [place]           Allergies:  No Known Allergies    Problem List:    Patient Active Problem List   Diagnosis Code    Anemia D64.9    Other specified anemias D64.89    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 No pertinent surgical history.     Social History:    Social History     Tobacco Use    Smoking status: Never    Smokeless tobacco: Never   Substance Use Topics    Alcohol use: Never                                Counseling given: Not Answered      Vital Signs (Current):   Vitals:    22 1420   Weight: 135 lb (61.2 kg)   Height: 5' 3\" (1.6 m)                                              BP Readings from Last 3 Encounters:   11/11/22 102/69   11/07/22 100/61   11/03/22 107/76       NPO Status:                                                                                 BMI:   Wt Readings from Last 3 Encounters:   11/03/22 135 lb (61.2 kg)   02/19/22 143 lb 8.3 oz (65.1 kg)     Body mass index is 23.91 kg/m². CBC:   Lab Results   Component Value Date/Time    WBC 6.7 11/07/2022 01:45 PM    RBC 3.03 11/07/2022 01:45 PM    HGB 8.5 11/07/2022 01:45 PM    HCT 26.8 11/07/2022 01:45 PM    MCV 88.5 11/07/2022 01:45 PM    RDW 19.0 11/07/2022 01:45 PM     11/07/2022 01:45 PM       CMP:   Lab Results   Component Value Date/Time     11/07/2022 01:45 PM    K 3.9 11/07/2022 01:45 PM    K 4.4 02/19/2022 08:26 PM     11/07/2022 01:45 PM    CO2 21 11/07/2022 01:45 PM    BUN 5 11/07/2022 01:45 PM    CREATININE 0.7 11/07/2022 01:45 PM    GFRAA >60 02/19/2022 08:26 PM    AGRATIO 1.6 02/19/2022 08:26 PM    LABGLOM >60 11/07/2022 01:45 PM    GLUCOSE 96 11/07/2022 01:45 PM    PROT 7.2 02/19/2022 08:26 PM    CALCIUM 9.1 11/07/2022 01:45 PM    BILITOT <0.2 02/19/2022 08:26 PM    ALKPHOS 59 02/19/2022 08:26 PM    AST 14 02/19/2022 08:26 PM    ALT <5 02/19/2022 08:26 PM       POC Tests: No results for input(s): POCGLU, POCNA, POCK, POCCL, POCBUN, POCHEMO, POCHCT in the last 72 hours. Coags:   Lab Results   Component Value Date/Time    PROTIME 11.1 02/19/2022 08:26 PM    INR 0.98 02/19/2022 08:26 PM    APTT 28.3 02/19/2022 08:26 PM       HCG (If Applicable):   Lab Results   Component Value Date    PREGTESTUR Negative 11/16/2022        ABGs: No results found for: PHART, PO2ART, AYC5NQC, NOY7EZI, BEART, C5ACZKUJ     Type & Screen (If Applicable):  No results found for: LABABO, LABRH    Drug/Infectious Status (If Applicable):  No results found for: HIV, HEPCAB    COVID-19 Screening (If Applicable):  No results found for: COVID19        Anesthesia Evaluation  Patient summary reviewed and Nursing notes reviewed no history of anesthetic complications:   Airway: Mallampati: II  TM distance: >3 FB   Neck ROM: full  Mouth opening: > = 3 FB   Dental: normal exam         Pulmonary:normal exam  breath sounds clear to auscultation  (+) current smoker (+mja)    (-) COPD, asthma and sleep apnea                           Cardiovascular:Negative CV ROS        (-) hypertension, past MI, CAD, CABG/stent, dysrhythmias,  angina and  CHF      Rhythm: regular  Rate: normal                    Neuro/Psych:   Negative Neuro/Psych ROS     (-) seizures, TIA and CVA           GI/Hepatic/Renal: Neg GI/Hepatic/Renal ROS       (-) GERD, liver disease and no renal disease       Endo/Other:    (+) blood dyscrasia (HARINI)::., .    (-) diabetes mellitus, hypothyroidism, hyperthyroidism               Abdominal:             Vascular: Other Findings:           Anesthesia Plan      general     ASA 2     (Consented for b/l TAP block)  Induction: intravenous. MIPS: Postoperative opioids intended and Prophylactic antiemetics administered. Anesthetic plan and risks discussed with patient. Use of blood products discussed with patient whom consented to blood products. Blood Products Consent Comment: I have discussed with the patient the rationale for blood component transfusion; its benefits in treating or preventing fatigue, organ damage, or death; and its risk which includes mild transfusion reactions, rare risk of blood borne infection, or more serious but rare reactions. I have discussed the alternatives to transfusion, including the risk and consequences of not receiving transfusion. The patient had an opportunity to ask questions and had agreed to proceed with transfusion of blood components. Plan discussed with CRNA.                     Agnes Pérez MD   11/16/2022

## 2022-11-16 NOTE — ANESTHESIA PROCEDURE NOTES
Peripheral Block    Patient location during procedure: OR  Reason for block: post-op pain management and at surgeon's request  Start time: 11/16/2022 8:40 AM  End time: 11/16/2022 8:45 AM  Staffing  Performed: anesthesiologist and resident/CRNA   Anesthesiologist: Lauren Guerrero MD  Resident/CRNA: Colleen Fallon APRN - CRNA  Preanesthetic Checklist  Completed: patient identified, IV checked, site marked, risks and benefits discussed, surgical/procedural consents, equipment checked, pre-op evaluation, timeout performed, anesthesia consent given, oxygen available, monitors applied/VS acknowledged, fire risk safety assessment completed and verbalized and blood product R/B/A discussed and consented  Peripheral Block   Patient position: supine  Prep: ChloraPrep  Provider prep: mask and sterile gloves  Patient monitoring: cardiac monitor, continuous pulse ox, frequent blood pressure checks, IV access, oxygen and continuous capnometry  Block type: TAP  Laterality: bilateral  Injection technique: single-shot  Guidance: ultrasound guided    Needle   Needle type: insulated echogenic nerve stimulator needle   Needle gauge: 20 G  Needle localization: ultrasound guidance and anatomical landmarks  Needle length: 10 cm  Assessment   Injection assessment: negative aspiration for heme, no paresthesia on injection, local visualized surrounding nerve on ultrasound and no intravascular symptoms  Paresthesia pain: none  Slow fractionated injection: yes  Hemodynamics: stableno  Outcomes: uncomplicated and patient tolerated procedure well    Additional Notes  U/S [id number]. (1) Under ultrasound guidance, a 20 gauge needle was inserted and placed in close proximity to the TAP nerve. (2) Ultrasound was also used to visualize the spread of the anesthetic in close proximity to the nerve being blocked.  (3) The nerve appeared anatomically normal, and (4 there were no apparent abnormal pathological findings on the image that were readily visible and related to the nerve being blocked. (5) A permanent ultrasound image was saved in the patient's record.             Medications Administered  bupivacaine (PF) 0.25 % - Perineural   30 mL - 11/16/2022 8:40:00 AM

## 2022-11-16 NOTE — FLOWSHEET NOTE
Patient complaining of incisional/back pain and has pushed PCA button 67 times in approximately 3 hours. 14 doses administered. Patient groggy. Ice applied to abdomen and back per request.  Abdominal binder in place. Abdomen not distended. Patient denies nausea. No active bleeding. Pulse ox 100%. Dr. Andreas James given report. Orders received to discontinue PCA pump and start oral oxycodone. Patient verbalized understanding. Oxycodone given.

## 2022-11-16 NOTE — PROGRESS NOTES
Pt admitted to PACU, awakening and following commands, lower abd incision CD&I, NSR on tele, oral airway in place. Vss. O2 saturations stable on simple mask 6L.  Will monitor

## 2022-11-16 NOTE — PROGRESS NOTES
Pt resting appears more comfortable, RR easy, abd binder placed per orders, suarez catheter removed, incision intact, vss, phase 1 discharge criteria met

## 2022-11-16 NOTE — ANESTHESIA POSTPROCEDURE EVALUATION
Department of Anesthesiology  Postprocedure Note    Patient: Gayatri Corral  MRN: [id number]  YOB: 1985  Date of evaluation: 11/16/2022      Procedure Summary     Date: 11/16/22 Room / Location: [address]    Anesthesia Start: 0730 Anesthesia Stop: 0901    Procedure: OPEN MYOMECTOMY (Abdomen) Diagnosis:       Uterine leiomyoma, unspecified location      Menometrorrhagia      Anemia, blood loss      (Uterine leiomyoma, unspecified location [D25.9])      (Menometrorrhagia [N92.1])      (Anemia, blood loss [D50.0])    Surgeons: Mackenzie Archer MD Responsible Provider: Agnes Pérez MD    Anesthesia Type: general ASA Status: 2          Anesthesia Type: No value filed.     Alisia Phase I: Alisia Score: 8    Alisia Phase II:        Anesthesia Post Evaluation    Patient location during evaluation: PACU  Patient participation: complete - patient participated  Level of consciousness: awake and alert  Airway patency: patent  Nausea & Vomiting: no vomiting and no nausea  Complications: no  Cardiovascular status: hemodynamically stable  Respiratory status: acceptable  Hydration status: stable

## 2022-11-16 NOTE — OP NOTE
Operative Note      Patient: Marta Muniz  YOB: 1985  MRN: [id number]    Date of Procedure: 11/16/2022    Pre-Op Diagnosis: Uterine leiomyoma, unspecified location [D25.9]  Menometrorrhagia [N92.1]  Anemia, blood loss [D50.0]    Post-Op Diagnosis: Same       Procedure(s):  OPEN MYOMECTOMY, removal of 12 fibroids ranging in size from approx 1cm to 8cm. 1 unit of pRBC in the OR    Surgeon(s):  Silvana Tolbert MD    Assistant:   Surgical Assistant: Radha Guardado    Anesthesia: Spinal    Estimated Blood Loss (mL): less than 431     Complications: None    Specimens:   ID Type Source Tests Collected by Time Destination   A : A) UTERINE FIBROIDS Tissue Tissue SURGICAL PATHOLOGY Silvana Tolbert MD 11/16/2022 3091        Implants:  * No implants in log *      Drains:   Urinary Catheter 11/16/22 2 Way; Hurst (Active)       Findings: multiple intramural, pedunculated fibroids in addition to a large submucosal fibroid(ranging in size from 1cm to 8cm approx)    Detailed Description of Procedure:   Pt was taken to the OR and placed under GET. She was prepped and draped in the usual sterile fashion in dorsal spine position. Hurst to gravity was inserted. Pfannensteil incision was made and pelvic cavity was entered. Large uterus was encountered. The uterus was delivered out of the pelvis. Multiple fibroids that were both intramural and pedunculatd were noted. A pen bratn rain was used as a tourniquet at the lower uteirne segment. Dilute pitressin was injected at the uterine serosa overlying the fibroids. The uterine serosa was then cut with a needle tip cautery and the fibroids were removed. One large vertical incision was then made over the fundus of the uterus until the endometrial cavity was entered. Large 8cm submucosal fibroid was encountered and removed without difficulty. Uterine cavity was irrigated. The incision was then closed in three layers using a v lock suture.   The remaining uterine incisions were also closed with a vlock suture in two layers with good hemostasis. The tourniquet was then removed. Pelvis was visualized and was hemostatic. The bilateral adnexa were WNL. All the instruments were removed from the pelvis. Parietal peritoneum was closed with a 3-0 vicryl. Rectus muscle was reapproximated in midline with a 2-0 vicryl. Fascia was closed with a 0 vicryl. Subcutaneous tissue was closed with 3-0 vicryl in an interupted fashion. Skin was closed with Insorb staples followed by dermababond. Pt was taken to the RR in stable condition. She received 1gm of TXA immediately prior to the incision. 2gm Ancef was also given. Hurst to gravity was left in.       Electronically signed by Mayank Valles MD on 11/16/2022 at 8:47 AM

## 2022-11-16 NOTE — PROGRESS NOTES
Pt up to bathroom with RN assist. Pt tolerated well, denies dizziness, ambulates with steady gait. Pt able to void 500ml without difficulty. Pt back to bed with RN stand by assist. Pt resting in bed, denies further needs at this time, call light within reach. Will continue to monitor.

## 2022-11-16 NOTE — PROGRESS NOTES
Pt awakening, vss, O2 saturations stable on RA, pt \"moaning\" having lots of pain, gave dilaudid and started Dilaudid PCA. Pt instructed on how to use and demonstrates understanding. Family updated.

## 2022-11-16 NOTE — ADDENDUM NOTE
Addendum  created 11/16/22 0911 by Agnes Pérez MD    Child order released for a procedure order, Clinical Note Signed, Intraprocedure Blocks edited, SmartForm saved

## 2022-11-16 NOTE — DISCHARGE SUMMARY
Physician Discharge Summary     Patient ID:  Lauren Fong  [id number]  48 y.o.  1985    Admit date: 11/16/2022    Discharge date:  11/17/22    Admitting Physician: Miguelina Adamson MD    Discharge Diagnoses: Uterine leiomyoma, unspecified location [D25.9]  Menometrorrhagia [N92.1]  Anemia, blood loss [D50.0]  S/P myomectomy [Z98.890]    Discharged Condition: STABLE    Procedures Performed: open myomectomy    Hospital Course: Patient was admitted on the day of surgery, and underwent an uncomplicated procedure. See dictated op note. Discharge Exam:  Appears well, AVSS, abdomen soft, appropriately tender, nondistended, BS present, incisions clean dry, intact    Disposition: Good    Patient Instructions: Activity: ambulate in house, no lifting or strenuous exercise for 6 weeks, no sex for 6-8 weeks and no driving while on analgesics    Diet: Regular    Wound Care: Keep wound clean and dry    Discharge Medication:      Medication List        START taking these medications      acetaminophen 500 MG tablet  Commonly known as: TYLENOL  Take 1 tablet by mouth 4 times daily as needed for Pain     docusate sodium 100 MG capsule  Commonly known as: COLACE  Take 1 capsule by mouth 2 times daily for 15 days     ibuprofen 800 MG tablet  Commonly known as: ADVIL;MOTRIN  Take 1 tablet by mouth every 6 hours as needed for Pain     oxyCODONE 5 MG immediate release tablet  Commonly known as: Roxicodone  Take 1 tablet by mouth every 6 hours as needed for Pain for up to 7 days. Intended supply: 7 days.  Take lowest dose possible to manage pain            ASK your doctor about these medications      ferrous sulfate 325 (65 Fe) MG tablet  Commonly known as: IRON 325  Take 1 tablet by mouth 2 times daily     medroxyPROGESTERone 10 MG tablet  Commonly known as: PROVERA     naproxen 500 MG tablet  Commonly known as: NAPROSYN               Where to Get Your Medications        You can get these medications from any pharmacy    Bring a paper prescription for each of these medications  acetaminophen 500 MG tablet  docusate sodium 100 MG capsule  ibuprofen 800 MG tablet  oxyCODONE 5 MG immediate release tablet          Follow-up with Ping Paez MD in 1-2 weeks    Signed:  Ping Paez MD  11/16/2022  9:19 AM

## 2022-11-17 VITALS
HEIGHT: 63 IN | SYSTOLIC BLOOD PRESSURE: 111 MMHG | DIASTOLIC BLOOD PRESSURE: 75 MMHG | TEMPERATURE: 98 F | OXYGEN SATURATION: 100 % | RESPIRATION RATE: 16 BRPM | BODY MASS INDEX: 25.34 KG/M2 | WEIGHT: 143.01 LBS | HEART RATE: 82 BPM

## 2022-11-17 LAB
BASOPHILS ABSOLUTE: 0 K/UL (ref 0–0.2)
BASOPHILS RELATIVE PERCENT: 0.4 %
EOSINOPHILS ABSOLUTE: 0 K/UL (ref 0–0.6)
EOSINOPHILS RELATIVE PERCENT: 0.1 %
HCT VFR BLD CALC: 25.8 % (ref 36–48)
HEMOGLOBIN: 8.3 G/DL (ref 12–16)
LYMPHOCYTES ABSOLUTE: 1.1 K/UL (ref 1–5.1)
LYMPHOCYTES RELATIVE PERCENT: 13 %
MCH RBC QN AUTO: 28.6 PG (ref 26–34)
MCHC RBC AUTO-ENTMCNC: 32.2 G/DL (ref 31–36)
MCV RBC AUTO: 88.6 FL (ref 80–100)
MONOCYTES ABSOLUTE: 0.6 K/UL (ref 0–1.3)
MONOCYTES RELATIVE PERCENT: 7.2 %
NEUTROPHILS ABSOLUTE: 6.7 K/UL (ref 1.7–7.7)
NEUTROPHILS RELATIVE PERCENT: 79.3 %
PDW BLD-RTO: 16.8 % (ref 12.4–15.4)
PLATELET # BLD: 318 K/UL (ref 135–450)
PMV BLD AUTO: 8 FL (ref 5–10.5)
RBC # BLD: 2.91 M/UL (ref 4–5.2)
WBC # BLD: 8.5 K/UL (ref 4–11)

## 2022-11-17 PROCEDURE — 6360000002 HC RX W HCPCS: Performed by: OBSTETRICS & GYNECOLOGY

## 2022-11-17 PROCEDURE — 85025 COMPLETE CBC W/AUTO DIFF WBC: CPT

## 2022-11-17 PROCEDURE — 2580000003 HC RX 258: Performed by: OBSTETRICS & GYNECOLOGY

## 2022-11-17 PROCEDURE — 6370000000 HC RX 637 (ALT 250 FOR IP): Performed by: OBSTETRICS & GYNECOLOGY

## 2022-11-17 RX ADMIN — CEFOXITIN SODIUM 1000 MG: 1 POWDER, FOR SOLUTION INTRAVENOUS at 03:24

## 2022-11-17 RX ADMIN — DEXTROSE AND SODIUM CHLORIDE: 5; 450 INJECTION, SOLUTION INTRAVENOUS at 09:42

## 2022-11-17 RX ADMIN — KETOROLAC TROMETHAMINE 30 MG: 30 INJECTION, SOLUTION INTRAMUSCULAR at 03:31

## 2022-11-17 RX ADMIN — CEFOXITIN SODIUM 1000 MG: 1 POWDER, FOR SOLUTION INTRAVENOUS at 10:22

## 2022-11-17 RX ADMIN — OXYCODONE 5 MG: 5 TABLET ORAL at 06:36

## 2022-11-17 RX ADMIN — OXYCODONE 10 MG: 5 TABLET ORAL at 14:03

## 2022-11-17 RX ADMIN — KETOROLAC TROMETHAMINE 30 MG: 30 INJECTION, SOLUTION INTRAMUSCULAR at 09:43

## 2022-11-17 ASSESSMENT — PAIN SCALES - GENERAL
PAINLEVEL_OUTOF10: 5
PAINLEVEL_OUTOF10: 7
PAINLEVEL_OUTOF10: 1

## 2022-11-17 NOTE — PLAN OF CARE
Problem: Discharge Planning  Goal: Discharge to home or other facility with appropriate resources  Outcome: Progressing     Problem: Pain  Goal: Verbalizes/displays adequate comfort level or baseline comfort level  11/17/2022 0055 by Amber Bravo RN  Outcome: Progressing  Flowsheets (Taken 11/16/2022 2101)  Verbalizes/displays adequate comfort level or baseline comfort level: Assess pain using appropriate pain scale  11/16/2022 1547 by Nii Wood RN  Outcome: Progressing

## 2022-11-17 NOTE — PROGRESS NOTES
Pt ok for discharge home today per Dr. Keshia Benson, follow up with Dr. Keshia Benson in office in 2 weeks for post op check.

## 2022-11-17 NOTE — DISCHARGE INSTRUCTIONS
Discharge Instructions  Resume normal activity as tolerated, no strenuous exercise, nothing in the vagina, . Incision  Diet  Resume your usual diet. Good nutrition promotes healing. Increase fluid intake. Call your doctor if you have  Temperature greater than 100.4  Persistent nausea and vomiting  Severe uncontrolled pain  Difficulty breathing, headache or visual disturbances  Hives  Persistent dizziness or light-headedness  Extreme fatigue  Any other questions or concerns you may have after discharge    In an emergency, call 911 or go to an Emergency Department at a nearby hospital    It is important to bring a complete, current list of your medications to any medical appointments or hospitalizations. REMINDER   Carry a list of your medications and allergies with you at all times. Call your pharmacy at least 1 week in advance to refill prescriptions.

## 2022-11-17 NOTE — PROGRESS NOTES
Discharge teaching completed and forms signed by patient. Copy witnessed by RN and given to patient. Patient verbalized understanding of all teaching points. Prescriptions given. Patient plans to follow-up with Dr. Tom Murray in 2 weeks as instructed. Patient verbalizes understanding of discharge instructions and denies further questions. Patient discharged in stable condition accompanied by family.

## 2022-11-17 NOTE — PROGRESS NOTES
CLINICAL PHARMACY NOTE: MEDS TO BEDS    Total # of Prescriptions Filled: 4   The following medications were delivered to the patient:  Oxycodone 5 mg  Docusate 100 mg  Tylenol  mg  Ibuprofen 800 mg    Additional Documentation:  Delivered to Patient=Signed  Ok to be delivered per Patricia Prado CPhT

## 2022-11-17 NOTE — PROGRESS NOTES
Pt reports passing gas and appetite returning. Pt encouraged to eat small, light meal.  Will continue to monitor.

## 2022-11-17 NOTE — PROGRESS NOTES
Pt ambulating in hallway with family member without difficulty. Denies dizziness, ambulates with steady gait. Will continue to monitor.

## 2022-11-17 NOTE — PROGRESS NOTES
Surgery Post-op note    Post-op Day #1    Subjective:   Emesis last night, hasn't passed flatus yet, appropriate pain control with meds    Objective:  Blood pressure 91/63, pulse 77, temperature 97.8 °F (36.6 °C), temperature source Oral, resp. rate 16, height 5' 3\" (1.6 m), weight 143 lb 0.2 oz (64.9 kg), last menstrual period 07/11/2022, SpO2 100 %. Abdominal exam: soft, nontender, DISTENDED, DECREASED BOWEL SOUNDS    Wound: well approximated incision    Labs:    Recent Labs     11/16/22  0644 11/17/22  0601   WBC 3.1* 8.5   HGB 8.4* 8.3*   HCT 27.8* 25.8*    318     No results for input(s): NA, K, CL, CO2, BUN, CREATININE, CALCIUM, AST, ALT in the last 72 hours.     Invalid input(s): LEIGHANN       Current Facility-Administered Medications:     dextrose 5 % and 0.45 % sodium chloride infusion, , IntraVENous, Continuous, Tahir Jane MD, Last Rate: 125 mL/hr at 11/17/22 0942, New Bag at 11/17/22 0942    sodium chloride flush 0.9 % injection 5-40 mL, 5-40 mL, IntraVENous, 2 times per day, Tahir Jane MD, 10 mL at 11/16/22 2106    sodium chloride flush 0.9 % injection 5-40 mL, 5-40 mL, IntraVENous, PRN, Tahir Jane MD, 10 mL at 11/16/22 1441    0.9 % sodium chloride infusion, , IntraVENous, PRN, Tahir Jane MD    ondansetron (ZOFRAN-ODT) disintegrating tablet 4 mg, 4 mg, Oral, Q8H PRN **OR** ondansetron (ZOFRAN) injection 4 mg, 4 mg, IntraVENous, Q6H PRN, Thair Jane MD, 4 mg at 11/16/22 1846    oxyCODONE (ROXICODONE) immediate release tablet 5 mg, 5 mg, Oral, Q4H PRN, 5 mg at 11/17/22 0636 **OR** oxyCODONE (ROXICODONE) immediate release tablet 10 mg, 10 mg, Oral, Q4H PRN, Tahir Jane MD, 10 mg at 11/16/22 2314    ketorolac (TORADOL) injection 30 mg, 30 mg, IntraVENous, Q6H, Tahir Jane MD, 30 mg at 11/17/22 0943    melatonin tablet 3 mg, 3 mg, Oral, Nightly PRN, Tahir Jane MD    ibuprofen (ADVIL;MOTRIN) tablet 800 mg, 800 mg, Oral, Q6H PRN, Tahir Jane MD    HYDROmorphone HCl PF (DILAUDID) injection 1 mg, 1 mg, IntraVENous, Q3H PRN, Francisco Castorena MD     Assessment/Plan:      Doing well. Routine p.o. Care. Diet: Clear Liquid    Postop ileus  Ambulation in [address] today. Anticipate discharge if passes flatus, if not keep until tomorrow.

## 2023-07-21 NOTE — ED NOTES
Abdominal pain Abdominal pain Abdominal pain Abdominal pain Abdominal pain Abdominal pain Abdominal pain Abdominal pain Bed: 11  Expected date:   Expected time:   Means of arrival:   Comments:  odalys Phillips, RONNY  02/19/22 2112 Abdominal pain Abdominal pain Abdominal pain Abdominal pain Abdominal pain Abdominal pain Abdominal pain Abdominal pain Abdominal pain Abdominal pain Abdominal pain Abdominal pain Abdominal pain Abdominal pain Abdominal pain Abdominal pain Abdominal pain Abdominal pain Abdominal pain Abdominal pain Abdominal pain Abdominal pain Abdominal pain Abdominal pain Abdominal pain Abdominal pain Abdominal pain Abdominal pain Abdominal pain

## (undated) DEVICE — SUTURE VCRL + SZ 4-0 L27IN ABSRB UD L26MM SH 1/2 CIR VCP415H

## (undated) DEVICE — PAD,ABDOMINAL,8"X10",ST,LF: Brand: MEDLINE

## (undated) DEVICE — TOTAL TRAY, DB, 100% SILI FOLEY, 16FR 10: Brand: MEDLINE

## (undated) DEVICE — SOLUTION IRRIG 1000ML 0.9% SOD CHL USP POUR PLAS BTL

## (undated) DEVICE — WET SKIN PREP TRAY: Brand: MEDLINE INDUSTRIES, INC.

## (undated) DEVICE — MAJOR SET UP PK

## (undated) DEVICE — MERCY FAIRFIELD TURNOVER KIT: Brand: MEDLINE INDUSTRIES, INC.

## (undated) DEVICE — SUTURE VCRL SZ 0 L36IN ABSRB UD CT-1 L36MM 1/2 CIR TAPR PNT VCP946H

## (undated) DEVICE — APPLICATOR MEDICATED 26 CC SOLUTION HI LT ORNG CHLORAPREP

## (undated) DEVICE — BLANKET WRM W29.9XL79.1IN UP BODY FORC AIR MISTRAL-AIR

## (undated) DEVICE — SUTURE VCRL + SZ 3-0 L36IN ABSRB UD L36MM CT-1 1/2 CIR VCP944H

## (undated) DEVICE — DRAPE,T,LAPARO,TRANS,STERILE: Brand: MEDLINE

## (undated) DEVICE — SPONGE LAP W18XL18IN WHT COT 4 PLY FLD STRUNG RADPQ DISP ST

## (undated) DEVICE — DECANTER FLD 9IN ST BG FOR ASEP TRNSF OF FLD

## (undated) DEVICE — PENROSE DRAIN 12" X 1/2": Brand: CARDINAL HEALTH

## (undated) DEVICE — SHEET,DRAPE,53X77,STERILE: Brand: MEDLINE

## (undated) DEVICE — INVIEW CLEAR LEGGINGS: Brand: CONVERTORS

## (undated) DEVICE — BLADE ES L6IN ELASTOMERIC COAT INSUL DURABLE BEND UPTO

## (undated) DEVICE — SUTURE DEV SZ 2-0 WND CLSR ABSRB GS-22 VLOC COVIDIEN VLOCM2145

## (undated) DEVICE — NEEDLE HYPO 22GA L1.5IN BLK POLYPR HUB S STL REG BVL STR

## (undated) DEVICE — GAUZE,SPONGE,4"X4",8PLY,STRL,LF,10/TRAY: Brand: MEDLINE

## (undated) DEVICE — STAPLER SKIN SQ 30 ABSRB STPL DISP INSORB

## (undated) DEVICE — SUTURE VCRL + SZ 0 L27IN ABSRB UD L36MM CT-1 1/2 CIR VCPP41D

## (undated) DEVICE — SYRINGE, LUER LOCK, 10ML: Brand: MEDLINE

## (undated) DEVICE — SUTURE MCRYL + SZ 4-0 L27IN ABSRB UD L19MM PS-2 3/8 CIR MCP426H

## (undated) DEVICE — GLOVE SURG SZ 65 THK91MIL LTX FREE SYN POLYISOPRENE

## (undated) DEVICE — SUTURE VCRL + SZ 2-0 L36IN ABSRB UD L36MM CT-1 1/2 CIR VCP945H

## (undated) DEVICE — DEVICE SECUREMENT AD W/ TRICOT ANCHR PD FOR F LTX SIL CATH

## (undated) DEVICE — BLADE CLIPPER GEN PURP NS

## (undated) DEVICE — DRAPE SURG L39XW46IN PERI OB

## (undated) DEVICE — SUTURE ABSRB L12IN L12MM SZ 2-0 GS-22 VLT GLYCOLIDE VLOCM2115